# Patient Record
Sex: MALE | Race: WHITE | NOT HISPANIC OR LATINO | Employment: OTHER | URBAN - METROPOLITAN AREA
[De-identification: names, ages, dates, MRNs, and addresses within clinical notes are randomized per-mention and may not be internally consistent; named-entity substitution may affect disease eponyms.]

---

## 2018-09-26 ENCOUNTER — OFFICE VISIT (OUTPATIENT)
Dept: SLEEP MEDICINE | Facility: CLINIC | Age: 48
End: 2018-09-26
Payer: COMMERCIAL

## 2018-09-26 VITALS
DIASTOLIC BLOOD PRESSURE: 90 MMHG | TEMPERATURE: 98 F | WEIGHT: 257 LBS | SYSTOLIC BLOOD PRESSURE: 136 MMHG | RESPIRATION RATE: 16 BRPM | HEIGHT: 71 IN | BODY MASS INDEX: 35.98 KG/M2 | OXYGEN SATURATION: 96 % | HEART RATE: 81 BPM

## 2018-09-26 DIAGNOSIS — G47.33 OSA (OBSTRUCTIVE SLEEP APNEA): Primary | ICD-10-CM

## 2018-09-26 PROCEDURE — 99204 OFFICE O/P NEW MOD 45 MIN: CPT | Performed by: PHYSICIAN ASSISTANT

## 2018-09-26 NOTE — MR AVS SNAPSHOT
After Visit Summary   9/26/2018    Jamison Gerber    MRN: [id number]           Patient Information     Date Of Birth          1970        Visit Information        Provider Department      9/26/2018 11:00 AM Goltz, Bennett Ezra, PA-C [facility] [place]        Today's Diagnoses     SEEMA (obstructive sleep apnea)    -  1       Follow-ups after your visit        Your next 10 appointments already scheduled     Oct 10, 2018  1:00 PM CDT   HST  with BED 7 SH SLEEP   [facility] ([facility] [place])    [address]  [place] [address]   [phone]            Oct 11, 2018 11:30 AM CDT   HST Drop Off with BED 7 SH SLEEP   [facility] ([facility])    [address]  [facility] [address]   [phone]            Oct 17, 2018  4:30 PM CDT   Telephone Visit with Bennett Ezra Goltz, PA-C   [facility] [place] ([facility])    [address]  [facility] [address]   [phone]           Note: this is not an onsite visit; there is no need to come to the facility.              Future tests that were ordered for you today     Open Future Orders        Priority Expected Expires Ordered    HST-Home Sleep Apnea Test Routine  3/28/2019 9/26/2018            Who to contact     If you have questions or need follow up information about today's clinic visit or your schedule please contact [facility] directly at [phone].  Normal or non-critical lab and imaging results will be communicated to you by MyChart, letter or phone within 4 business days after the clinic has received the results. If you do not hear from us within 7 days, please contact the clinic through MyChart or phone. If you have a critical or abnormal lab result, we will notify you by phone as soon as possible.  Submit refill requests through Vodd[employer] or call your  "pharmacy and they will forward the refill request to us. Please allow 3 business days for your refill to be completed.          Additional Information About Your Visit        MyChart Information     [employer] lets you send messages to your doctor, view your test results, renew your prescriptions, schedule appointments and more. To sign up, go to [email or web address] . Click on \"Log in\" on the left side of the screen, which will take you to the Welcome page. Then click on \"Sign up Now\" on the right side of the page.     You will be asked to enter the access code listed below, as well as some personal information. Please follow the directions to create your username and password.     Your access code is: [email or web address]  Expires: 2018 12:25 PM     Your access code will  in 90 days. If you need help or a new code, please call your [place] clinic or [phone].        Care EveryWhere ID     This is your [facility] EveryWhere ID. This could be used by other organizations to access your [place] medical records  [id number]        Your Vitals Were     Pulse Temperature Respirations Height Pulse Oximetry BMI (Body Mass Index)    81 98  F (36.7  C) (Oral) 16 1.803 m (5' 11\") 96% 35.84 kg/m2       Blood Pressure from Last 3 Encounters:   18 136/90   12 126/83    Weight from Last 3 Encounters:   18 116.6 kg (257 lb)   12 105.7 kg (233 lb)              We Performed the Following     Comprehensive DME        Primary Care Provider Office Phone # Fax #    Rosangela Abdi [phone]       [address] [facility] [facility] [address]        Equal Access to Services     ESTUARDO BRADFORD : Meera Clarke, shelly aguilera, lalit segura, frank son. So [facility] [phone].    ATENCIÓN: Si habla español, tiene a burns disposición servicios gratuitos de asistencia lingüística. Ekaterina al [phone].    We comply with applicable federal " civil rights laws and [place] laws. We do not discriminate on the basis of race, color, national origin, age, disability, sex, sexual orientation, or gender identity.            Thank you!     Thank you for choosing [place] [facility]  for your care. Our goal is always to provide you with excellent care. Hearing back from our patients is one way we can continue to improve our services. Please take a few minutes to complete the written survey that you may receive in the mail after your visit with us. Thank you!             Your Updated Medication List - Protect others around you: Learn how to safely use, store and throw away your medicines at www.disposemymeds.org.      Notice  As of 9/26/2018 12:25 PM    You have not been prescribed any medications.

## 2018-10-10 ENCOUNTER — OFFICE VISIT (OUTPATIENT)
Dept: SLEEP MEDICINE | Facility: CLINIC | Age: 48
End: 2018-10-10
Payer: COMMERCIAL

## 2018-10-10 DIAGNOSIS — G47.33 OSA (OBSTRUCTIVE SLEEP APNEA): ICD-10-CM

## 2018-10-10 PROCEDURE — G0399 HOME SLEEP TEST/TYPE 3 PORTA: HCPCS | Performed by: INTERNAL MEDICINE

## 2018-10-10 NOTE — MR AVS SNAPSHOT
"              After Visit Summary   10/10/2018    Jamison Gerber    MRN: [id number]           Patient Information     Date Of Birth          1970        Visit Information        Provider Department      10/10/2018 1:00 PM BED 7 SH SLEEP [facility]        Today's Diagnoses     SEEMA (obstructive sleep apnea)           Follow-ups after your visit        Your next 10 appointments already scheduled     Oct 11, 2018 11:30 AM CDT   HST Drop Off with BED 7 SH SLEEP   [facility] ([facility])    [address]  [place] [id number] [phone]            Oct 17, 2018  4:30 PM CDT   Telephone Visit with Bennett Ezra Goltz, PA-C   [facility] ([facility])    [address]  [place] [address] [phone]           Note: this is not an onsite visit; there is no need to come to the facility.              Who to contact     If you have questions or need follow up information about today's clinic visit or your schedule please contact [facility] directly at [phone].  Normal or non-critical lab and imaging results will be communicated to you by MyChart, letter or phone within 4 business days after the clinic has received the results. If you do not hear from us within 7 days, please contact the clinic through [employer] or phone. If you have a critical or abnormal lab result, we will notify you by phone as soon as possible.  Submit refill requests through [employer] or call your pharmacy and they will forward the refill request to us. Please allow 3 business days for your refill to be completed.          Additional Information About Your Visit        MyChart Information     [employer] lets you send messages to your doctor, view your test results, renew your prescriptions, schedule appointments and more. To sign up, go to [email or web address] . Click on \"Log in\" on the left " "side of the screen, which will take you to the Welcome page. Then click on \"Sign up Now\" on the right side of the page.     You will be asked to enter the access code listed below, as well as some personal information. Please follow the directions to create your username and password.     Your access code is: [email or web address]  Expires: 2018 12:25 PM     Your access code will  in 90 days. If you need help or a new code, please call your [place] clinic or [phone].        Care EveryWhere ID     This is your Care EveryWhere ID. This could be used by other organizations to access your [place] medical records  [id number]         Blood Pressure from Last 3 Encounters:   18 136/90   12 126/83    Weight from Last 3 Encounters:   18 116.6 kg (257 lb)   12 105.7 kg (233 lb)              We Performed the Following     HST-Home Sleep Apnea Test        Primary Care Provider Office Phone # Fax #    Rosangela Abdi [phone]       [address] [facility] [address]        Equal Access to Services     ESTUARDO BRADFORD AH: Hadii ally littleo Sojacques, waaxda luqadaha, qaybta kaalmada adejenniferda, frank son. So [facility] [phone].    ATENCIÓN: Si habla español, tiene a burns disposición servicios gratuitos de asistencia lingüística. Ekaterina al [phone].    We comply with applicable federal civil rights laws and [place] laws. We do not discriminate on the basis of race, color, national origin, age, disability, sex, sexual orientation, or gender identity.            Thank you!     Thank you for choosing [place] [facility]  for your care. Our goal is always to provide you with excellent care. Hearing back from our patients is one way we can continue to improve our services. Please take a few minutes to complete the written survey that you may receive in the mail after your visit with us. Thank you!             Your Updated Medication " List - Protect others around you: Learn how to safely use, store and throw away your medicines at www.disposemymeds.org.      Notice  As of 10/10/2018  4:47 PM    You have not been prescribed any medications.

## 2018-10-11 ENCOUNTER — DOCUMENTATION ONLY (OUTPATIENT)
Dept: SLEEP MEDICINE | Facility: CLINIC | Age: 48
End: 2018-10-11
Payer: COMMERCIAL

## 2018-10-11 NOTE — PROGRESS NOTES
This HSAT was performed using a Noxturnal T3 device which recorded snore, sound, movement activity, body position, nasal pressure, oronasal thermal airflow, pulse, oximetry and both chest and abdominal respiratory effort. HSAT data was restricted to the time patient states they were in bed.     HSAT was scored using 1B 4% hypopnea rule.     HST AHI (Non-PAT): (P) 38.9  Snoring was reported as loud.  Time with SpO2 below 89% was 31.5 minutes.   Overall signal quality was good.     Pt will follow up with sleep provider to determine appropriate therapy.

## 2018-10-12 NOTE — PROCEDURES
"HOME SLEEP STUDY INTERPRETATION    Patient: Jamison Gerber  MRN: [id number]  YOB: 1970  Study Date: 10/10/2018  Referring Provider: Rosangela Abdi;   Ordering Provider: Bennett Goltz, PA     Indications for Home Study: Jamison Gerber is a 48 year old male with a history of previously diagnosed SEEMA, on CPAP who has a reevaluation with HST.    Estimated body mass index is 35.84 kg/(m^2) as calculated from the following:    Height as of 9/26/18: 1.803 m (5' 11\").    Weight as of 9/26/18: 116.6 kg (257 lb).  Total score - [place]: 5 (9/26/2018 10:57 AM)  STOP-BANG: -/8    Data: A full night home sleep study was performed recording the standard physiologic parameters including body position, movement, sound, nasal pressure, thermal oral airflow, chest and abdominal movements with respiratory inductance plethysmography, and oxygen saturation by pulse oximetry. Pulse rate was estimated by oximetry recording. This study was considered adequate based on > 4 hours of quality oximetry and respiratory recording. As specified by the AASM Manual for the Scoring of Sleep and Associated events, version 2.3, Rule VIII.D 1B, 4% oxygen desaturation scoring for hypopneas is used as a standard of care on all home sleep apnea testing.    Analysis Time:  240.7 minutes    Respiration:   Sleep Associated Hypoxemia: sustained hypoxemia was present. Baseline oxygen saturation was 91%.  Time with saturation less than or equal to 88% was 31.5 minutes. The lowest oxygen saturation was 75%.   Snoring: Snoring was present.  Respiratory events: The home study revealed a presence of 84 obstructive apneas and 1 mixed and 7 central apneas. There were 64 hypopneas resulting in a combined apnea/hypopnea index [AHI] of 38.9 events per hour.  AHI was 36.4 per hour supine, - per hour prone, 41.4 per hour on left side, and 39.6 per hour on right side.   Pattern: Excluding events noted above, respiratory rate and pattern was " Normal.    Position: Percent of time spent: supine - 15%, prone - 0%, on left - 39.7%, on right - 35.9%.    Heart Rate: By pulse oximetry normal rate was noted.     Assessment:   Severe obstructive sleep apnea.  Sleep associated hypoxemia was present.    Recommendations:   Continue CPAP therapy. .  Weight management.    Diagnosis Code(s): Obstructive Sleep Apnea G47.33, Hypoxemia G47.36    Pieter Espino MD, MD, October 12, 2018   Diplomate, American Board of Psychiatry and Neurology, Sleep Medicine

## 2018-10-17 ENCOUNTER — VIRTUAL VISIT (OUTPATIENT)
Dept: SLEEP MEDICINE | Facility: CLINIC | Age: 48
End: 2018-10-17
Payer: COMMERCIAL

## 2018-10-17 DIAGNOSIS — G47.33 OSA (OBSTRUCTIVE SLEEP APNEA): Primary | ICD-10-CM

## 2018-10-17 PROCEDURE — 98967 PH1 ASSMT&MGMT NQHP 11-20: CPT | Performed by: PHYSICIAN ASSISTANT

## 2018-10-17 NOTE — PROGRESS NOTES
Sleep Study Follow-Up Visit:    Date on this visit: 10/17/2018    Jamison Gerber had a virtual visit today for follow-up of his home sleep study done on 10/10/2018 at the [facility] for management of previously diagnosed SEEMA. His medical history is otherwise noncontributory.       He had a sleeps study at [facility] in 7/2004. His AHI was 34/hr, RDI 47/hr with an O2 [place] of 86%. CPAP 10 cm was effective.     Analysis Time:  240.7 minutes     Respiration:   Sleep Associated Hypoxemia: sustained hypoxemia was present. Baseline oxygen saturation was 91%.  Time with saturation less than or equal to 88% was 31.5 minutes. The lowest oxygen saturation was 75%.   Snoring: Snoring was present.  Respiratory events: The home study revealed a presence of 84 obstructive apneas and 1 mixed and 7 central apneas. There were 64 hypopneas resulting in a combined apnea/hypopnea index [AHI] of 38.9 events per hour.  AHI was 36.4 per hour supine, - per hour prone, 41.4 per hour on left side, and 39.6 per hour on right side.   Pattern: Excluding events noted above, respiratory rate and pattern was Normal.     Position: Percent of time spent: supine - 15%, prone - 0%, on left - 39.7%, on right - 35.9%.     Heart Rate: By pulse oximetry normal rate was noted.     His pressures are 10-14 cm (changed from 10-12 cm at the last visit). He feels better with the increased pressures. He is not even waking to urinate now.     Past medical/surgical history, family history, social history, medications and allergies were reviewed.      Problem List:  Patient Active Problem List    Diagnosis Date Noted     SEEMA (obstructive sleep apnea) 09/26/2018     Priority: Medium        Impression/Plan:    (G47.33) SEEMA (obstructive sleep apnea)  (primary encounter diagnosis)  Comment: This study confirms severe SEEMA, independent of position. He slept terribly without CPAP and is sleeping great now. He would like to allow [place] to have access to his  machine remotely. He was using [facility] in the past.   Plan: Comprehensive DME        Continue auto CPAP 10-14 cm. We talked about travel machines. I spoke with Raymond from [facility] to look into getting remote access to his machine.      He will follow up with me in about 1-2 year(s).     11 minutes spent with patient, all of which were spent counseling, consulting, coordinating plan of care.      Bennett Goltz, PA-C    CC: No ref. provider found

## 2018-10-17 NOTE — MR AVS SNAPSHOT
"              After Visit Summary   10/17/2018    Jamison Gerber    MRN: [id number]           Patient Information     Date Of Birth          1970        Visit Information        Provider Department      10/17/2018 4:30 PM Goltz, Bennett Ezra, PA-C [facility] [place]        Today's Diagnoses     SEEMA (obstructive sleep apnea)    -  1       Follow-ups after your visit        Follow-up notes from your care team     Return in about 1 year (around 10/17/2019) for CPAP compliance recheck.      Your next 10 appointments already scheduled     Oct 17, 2018  4:30 PM CDT   Telephone Visit with Bennett Ezra Goltz, PA-C   [facility] [place] ([facility])    [address] [address] [phone]           Note: this is not an onsite visit; there is no need to come to the facility.              Who to contact     If you have questions or need follow up information about today's clinic visit or your schedule please contact [facility] directly at [phone].  Normal or non-critical lab and imaging results will be communicated to you by [employer], letter or phone within 4 business days after the clinic has received the results. If you do not hear from us within 7 days, please contact the clinic through [employer] or phone. If you have a critical or abnormal lab result, we will notify you by phone as soon as possible.  Submit refill requests through [employer] or call your pharmacy and they will forward the refill request to us. Please allow 3 business days for your refill to be completed.          Additional Information About Your Visit        MyChart Information     [employer] lets you send messages to your doctor, view your test results, renew your prescriptions, schedule appointments and more. To sign up, go to [email or web address] . Click on \"Log in\" on the left side of the screen, which will take you to the Welcome page. Then click on \"Sign " "up Now\" on the right side of the page.     You will be asked to enter the access code listed below, as well as some personal information. Please follow the directions to create your username and password.     Your access code is: [email or web address]  Expires: 2018 12:25 PM     Your access code will  in 90 days. If you need help or a new code, please call your [place] clinic or [phone].        Care EveryWhere ID     This is your Care EveryWhere ID. This could be used by other organizations to access your [place] medical records  [id number]         Blood Pressure from Last 3 Encounters:   18 136/90   12 126/83    Weight from Last 3 Encounters:   18 116.6 kg (257 lb)   12 105.7 kg (233 lb)              We Performed the Following     Comprehensive DME        Primary Care Provider Office Phone # Fax #    Rosangela Abdi [phone]       [address] [address]        Equal Access to Services     [facility]: Hadii aad ku hadasho Soomaali, waaxda luqadaha, qaybta kaalmada adeernie, frank steele . So [facility] [phone].    ATENCIÓN: Si habla español, tiene a burns disposición servicios gratuitos de asistencia lingüística. Ekaterina al [phone].    We comply with applicable federal civil rights laws and [place] laws. We do not discriminate on the basis of race, color, national origin, age, disability, sex, sexual orientation, or gender identity.            Thank you!     Thank you for choosing [place] [facility]  for your care. Our goal is always to provide you with excellent care. Hearing back from our patients is one way we can continue to improve our services. Please take a few minutes to complete the written survey that you may receive in the mail after your visit with us. Thank you!             Your Updated Medication List - Protect others around you: Learn how to safely use, store and throw away your " medicines at www.disposemymeds.org.      Notice  As of 10/17/2018  2:03 PM    You have not been prescribed any medications.

## 2018-10-19 ENCOUNTER — TELEPHONE (OUTPATIENT)
Dept: SLEEP MEDICINE | Facility: CLINIC | Age: 48
End: 2018-10-19

## 2018-10-19 NOTE — TELEPHONE ENCOUNTER
LVM FOR PT INFORMING HIM THE TRANSFER IS COMPLETE AND HE CAN ORDER CPAP SUPPLIES FROM [facility]. LEFT THE MAIN NUMBER FOR CALL BACK OPTION.   ALSO INFORMED HIM HE SLEEP PROVIDER AT [place] WILL BE ABLE TO SEE HIS DOWNLOAD.      ALSO -   CALLED [facility] AND ASKED THAT THEY DELETE THEIR ACCT IN [place]. THIS WAY [place] CAN SEE PT'S DOWNLOAD. [facility] SAID THEY DELETED THEIR ACCT.    CREATED AN ACCT IN [place] FOR PT. IT APPEARS TO HAVE WORKED. WILL NEED TO CHECK ONCE MACHINE CONNECTS AGAIN. Last comment:

## 2018-12-06 ENCOUNTER — OFFICE VISIT (OUTPATIENT)
Dept: FAMILY MEDICINE | Facility: CLINIC | Age: 48
End: 2018-12-06
Payer: COMMERCIAL

## 2018-12-06 VITALS
BODY MASS INDEX: 35 KG/M2 | OXYGEN SATURATION: 98 % | WEIGHT: 250 LBS | HEART RATE: 66 BPM | SYSTOLIC BLOOD PRESSURE: 118 MMHG | TEMPERATURE: 97.7 F | DIASTOLIC BLOOD PRESSURE: 86 MMHG | HEIGHT: 71 IN

## 2018-12-06 DIAGNOSIS — Z13.220 LIPID SCREENING: ICD-10-CM

## 2018-12-06 DIAGNOSIS — L91.8 SKIN TAG: ICD-10-CM

## 2018-12-06 DIAGNOSIS — Z12.5 SCREENING FOR PROSTATE CANCER: ICD-10-CM

## 2018-12-06 DIAGNOSIS — E78.5 HYPERLIPIDEMIA LDL GOAL <100: ICD-10-CM

## 2018-12-06 DIAGNOSIS — Z12.11 SPECIAL SCREENING FOR MALIGNANT NEOPLASMS, COLON: ICD-10-CM

## 2018-12-06 DIAGNOSIS — Z82.49 FAMILY HISTORY OF ISCHEMIC HEART DISEASE: ICD-10-CM

## 2018-12-06 DIAGNOSIS — Z13.1 SCREENING FOR DIABETES MELLITUS: ICD-10-CM

## 2018-12-06 DIAGNOSIS — Z00.00 ROUTINE HISTORY AND PHYSICAL EXAMINATION OF ADULT: Primary | ICD-10-CM

## 2018-12-06 LAB
ANION GAP SERPL CALCULATED.3IONS-SCNC: 8 MMOL/L (ref 3–14)
BASOPHILS # BLD AUTO: 0 10E9/L (ref 0–0.2)
BASOPHILS NFR BLD AUTO: 0.3 %
BUN SERPL-MCNC: 18 MG/DL (ref 7–30)
CALCIUM SERPL-MCNC: 9.6 MG/DL (ref 8.5–10.1)
CHLORIDE SERPL-SCNC: 104 MMOL/L (ref 94–109)
CHOLEST SERPL-MCNC: 205 MG/DL
CO2 SERPL-SCNC: 25 MMOL/L (ref 20–32)
CREAT SERPL-MCNC: 0.88 MG/DL (ref 0.66–1.25)
DIFFERENTIAL METHOD BLD: NORMAL
EOSINOPHIL # BLD AUTO: 0.2 10E9/L (ref 0–0.7)
EOSINOPHIL NFR BLD AUTO: 2.9 %
ERYTHROCYTE [DISTWIDTH] IN BLOOD BY AUTOMATED COUNT: 13.8 % (ref 10–15)
GFR SERPL CREATININE-BSD FRML MDRD: >90 ML/MIN/1.7M2
GLUCOSE SERPL-MCNC: 100 MG/DL (ref 70–99)
HBA1C MFR BLD: 5.6 % (ref 0–5.6)
HCT VFR BLD AUTO: 46.2 % (ref 40–53)
HDLC SERPL-MCNC: 44 MG/DL
HGB BLD-MCNC: 15.3 G/DL (ref 13.3–17.7)
LDLC SERPL CALC-MCNC: 139 MG/DL
LYMPHOCYTES # BLD AUTO: 2.2 10E9/L (ref 0.8–5.3)
LYMPHOCYTES NFR BLD AUTO: 31.3 %
MCH RBC QN AUTO: 30 PG (ref 26.5–33)
MCHC RBC AUTO-ENTMCNC: 33.1 G/DL (ref 31.5–36.5)
MCV RBC AUTO: 91 FL (ref 78–100)
MONOCYTES # BLD AUTO: 0.8 10E9/L (ref 0–1.3)
MONOCYTES NFR BLD AUTO: 11.6 %
NEUTROPHILS # BLD AUTO: 3.7 10E9/L (ref 1.6–8.3)
NEUTROPHILS NFR BLD AUTO: 53.9 %
NONHDLC SERPL-MCNC: 161 MG/DL
PLATELET # BLD AUTO: 204 10E9/L (ref 150–450)
POTASSIUM SERPL-SCNC: 4.3 MMOL/L (ref 3.4–5.3)
RBC # BLD AUTO: 5.1 10E12/L (ref 4.4–5.9)
SODIUM SERPL-SCNC: 137 MMOL/L (ref 133–144)
TRIGL SERPL-MCNC: 109 MG/DL
WBC # BLD AUTO: 6.9 10E9/L (ref 4–11)

## 2018-12-06 PROCEDURE — 36415 COLL VENOUS BLD VENIPUNCTURE: CPT | Performed by: INTERNAL MEDICINE

## 2018-12-06 PROCEDURE — 80048 BASIC METABOLIC PNL TOTAL CA: CPT | Performed by: INTERNAL MEDICINE

## 2018-12-06 PROCEDURE — 99386 PREV VISIT NEW AGE 40-64: CPT | Performed by: INTERNAL MEDICINE

## 2018-12-06 PROCEDURE — 80061 LIPID PANEL: CPT | Performed by: INTERNAL MEDICINE

## 2018-12-06 PROCEDURE — G0103 PSA SCREENING: HCPCS | Performed by: INTERNAL MEDICINE

## 2018-12-06 PROCEDURE — 85025 COMPLETE CBC W/AUTO DIFF WBC: CPT | Performed by: INTERNAL MEDICINE

## 2018-12-06 PROCEDURE — 83036 HEMOGLOBIN GLYCOSYLATED A1C: CPT | Performed by: INTERNAL MEDICINE

## 2018-12-06 RX ORDER — CHOLECALCIFEROL (VITAMIN D3) 25 MCG
5000 CAPSULE ORAL DAILY
COMMUNITY

## 2018-12-06 RX ORDER — OMEGA-3 FATTY ACIDS/FISH OIL 300-1000MG
CAPSULE ORAL DAILY
COMMUNITY

## 2018-12-06 NOTE — MR AVS SNAPSHOT
After Visit Summary   12/6/2018    Jamison Gerber    MRN: [id number]           Patient Information     Date Of Birth          1970        Visit Information        Provider Department      12/6/2018 10:20 AM Danyelle Larkin MD [facility]        Today's Diagnoses     Routine history and physical examination of adult    -  1    Special screening for malignant neoplasms, colon        Lipid screening        Screening for diabetes mellitus        Screening for prostate cancer        Family history of ischemic heart disease        Hyperlipidemia LDL goal <100        Skin tag          Care Instructions      Preventive Health Recommendations  Male Ages 50 - 64    Yearly exam:             See your health care provider every year in order to  o   Review health changes.   o   Discuss preventive care.    o   Review your medicines if your doctor has prescribed any.     Have a cholesterol test every 5 years, or more frequently if you are at risk for high cholesterol/heart disease.     Have a diabetes test (fasting glucose) every three years. If you are at risk for diabetes, you should have this test more often.     Have a colonoscopy at age 50, or have a yearly FIT test (stool test). These exams will check for colon cancer.      Talk with your health care provider about whether or not a prostate cancer screening test (PSA) is right for you.    You should be tested each year for STDs (sexually transmitted diseases), if you re at risk.     Shots: Get a flu shot each year. Get a tetanus shot every 10 years.     Nutrition:    Eat at least 5 servings of fruits and vegetables daily.     Eat whole-grain bread, whole-wheat pasta and brown rice instead of white grains and rice.     Get adequate Calcium and Vitamin D.     Lifestyle    Exercise for at least 150 minutes a week (30 minutes a day, 5 days a week). This will help you control your weight and prevent disease.     Limit alcohol to one drink per day.      No smoking.     Wear sunscreen to prevent skin cancer.     See your dentist every six months for an exam and cleaning.     See your eye doctor every 1 to 2 years.            Follow-ups after your visit        Additional Services     CARDIO [facility] ADULT REFERRAL       [facility] is referring you to Cardiology Services.       The [facility] Representative will assist you in the coordination of your Cardiology care as prescribed by your physician.    The [facility] Representative will call you within 24 hours to help schedule your appointment, or you may contact the [facility] Representative at: [phone].         Type of Referral: New Cardiology Referral            Timeframe requested: within 4 weeks       Coverage of these services is subject to the terms and limitations of your health insurance plan.  Please call member services at your health plan with any benefit or coverage questions.      If X-rays, CT or MRI's have been performed, please contact the facility where they were done to arrange for , prior to your scheduled appointment.  Please bring this referral request to your appointment and present it to your specialist.            GASTROENTEROLOGY ADULT REF PROCEDURE ONLY [facility] [phone]; No Provider Preference       Last Lab Result: Creatinine (mg/dL)       Date                     Value                 08/26/2006               1.05             ----------  Body mass index is 34.87 kg/(m^2).     Needed:  No  Language:  English    Patient will be contacted to schedule procedure.     Please be aware that coverage of these services is subject to the terms and limitations of your health insurance plan.  Call member services at your health plan with any benefit or coverage questions.  Any procedures must be performed at a [facility] facility OR coordinated by your clinic's referral office.    Please bring the following with you to your appointment:    (1)  Any X-Rays, CTs or MRIs which have been performed.  Contact the facility where they were done to arrange for  prior to your scheduled appointment.    (2) List of current medications   (3) This referral request   (4) Any documents/labs given to you for this referral            SKIN CARE REFERRAL       Your provider has referred you to: [facility]: [facility] [phone] (Dr. Garth Lomax Jr.)   [email or web address]  [facility]: [facility] [place] [phone]  [email or web address]     Please be aware that coverage of these services is subject to the terms and limitations of your health insurance plan.  Please check with your insurance prior to the appointment to ensure appropriate coverage for any services considered cosmetic in nature or not medically necessary.    Please bring the following with you to your appointment:    (1) Any X-Rays, CTs or MRIs which have been performed.  Contact the facility where they were done to arrange for  prior to your scheduled appointment.  Any new CT, MRI or other procedures ordered by your specialist must be performed at a [facility] facility or coordinated by your clinic's referral office.  (2) List of current medications  (3) This referral request   (4) Any documents/labs given to you for this referral                  Follow-up notes from your care team     Return in about 1 year (around 12/6/2019).      Who to contact     If you have questions or need follow up information about today's clinic visit or your schedule please contact [facility] directly at [phone].  Normal or non-critical lab and imaging results will be communicated to you by MyChart, letter or phone within 4 business days after the clinic has received the results. If you do not hear from us within 7 days, please contact the clinic through MyChart or phone. If you have a critical  "or abnormal lab result, we will notify you by phone as soon as possible.  Submit refill requests through [employer] or call your pharmacy and they will forward the refill request to us. Please allow 3 business days for your refill to be completed.          Additional Information About Your Visit        [employer] Information     [employer] lets you send messages to your doctor, view your test results, renew your prescriptions, schedule appointments and more. To sign up, go to [email or web address] . Click on \"Log in\" on the left side of the screen, which will take you to the Welcome page. Then click on \"Sign up Now\" on the right side of the page.     You will be asked to enter the access code listed below, as well as some personal information. Please follow the directions to create your username and password.     Your access code is: [email or web address]  Expires: 2018 11:25 AM     Your access code will  in 90 days. If you need help or a new code, please call your [facility] clinic or [phone].        Care EveryWhere ID     This is your Care EveryWhere ID. This could be used by other organizations to access your [facility] medical records  [id number]        Your Vitals Were     Pulse Temperature Height Pulse Oximetry BMI (Body Mass Index)       66 97.7  F (36.5  C) (Tympanic) 5' 11\" (1.803 m) 98% 34.87 kg/m2        Blood Pressure from Last 3 Encounters:   18 118/86   18 136/90   12 126/83    Weight from Last 3 Encounters:   18 250 lb (113.4 kg)   18 257 lb (116.6 kg)   12 233 lb (105.7 kg)              We Performed the Following     Basic metabolic panel  (Ca, Cl, CO2, Creat, Gluc, K, Na, BUN)     CARDIO  ADULT REFERRAL     CBC with platelets and differential     GASTROENTEROLOGY ADULT REF PROCEDURE ONLY [facility] [phone]; No Provider Preference     Hemoglobin A1c     Lipid panel reflex to direct LDL Fasting     PSA, screen     SKIN CARE REFERRAL        " Primary Care Provider Office Phone # Fax #    Rosangela Abdi [phone]       [address]  [facility] [address]        Equal Access to Services     ESTUARDO BRADFORD : Hadii aad ku hadchecoo Sokayaali, waaxda luqadaha, qaybta kaalmada adejenniferda, frank raogrzegorz kumartisha eliasnikkie son. So [facility] [phone].    ATENCIÓN: Si habla español, tiene a burns disposición servicios gratuitos de asistencia lingüística. Llame al [phone].    We comply with applicable federal civil rights laws and [place] laws. We do not discriminate on the basis of race, color, national origin, age, disability, sex, sexual orientation, or gender identity.            Thank you!     Thank you for choosing [facility]  for your care. Our goal is always to provide you with excellent care. Hearing back from our patients is one way we can continue to improve our services. Please take a few minutes to complete the written survey that you may receive in the mail after your visit with us. Thank you!             Your Updated Medication List - Protect others around you: Learn how to safely use, store and throw away your medicines at www.disposemymeds.org.          This list is accurate as of 12/6/18 10:53 AM.  Always use your most recent med list.                   Brand Name Dispense Instructions for use Diagnosis    MULTIVITAMIN ADULT PO           omega 3 1000 MG Caps           PROBIOTIC DAILY PO           PSYLLIUM HUSK PO           Vitamin D-3 1000 units Caps

## 2018-12-06 NOTE — PROGRESS NOTES
SUBJECTIVE:   CC: Jamison Gerber is an 48 year old male who presents for preventive health visit.     Healthy Habits:    Do you get at least three servings of calcium containing foods daily (dairy, green leafy vegetables, etc.)? yes    Amount of exercise or daily activities, outside of work: 7 day(s) per week    Problems taking medications regularly not applicable    Medication side effects: No    Have you had an eye exam in the past two years? yes    Do you see a dentist twice per year? yes    Do you have sleep apnea, excessive snoring or daytime drowsiness? yes - APNEA, uses a CPAP      Today's PHQ-2 Score:   PHQ-2 ( 1999 Pfizer) 12/6/2018   Q1: Little interest or pleasure in doing things 0   Q2: Feeling down, depressed or hopeless 0   PHQ-2 Score 0       Abuse: Current or Past(Physical, Sexual or Emotional)- No  Do you feel safe in your environment? Yes    Social History   Substance Use Topics     Smoking status: Former Smoker     Quit date: 1/24/2012     Smokeless tobacco: Never Used     Alcohol use Yes      Comment: 3-4 days per week, 2-3 drinks     If you drink alcohol do you typically have >3 drinks per day or >7 drinks per week? Yes - AUDIT SCORE:     AUDIT - Alcohol Use Disorders Identification Test - Reproduced from the World Health Organization Audit 2001 (Second Edition) 12/6/2018   1.  How often do you have a drink containing alcohol? 2 to 3 times a week   2.  How many drinks containing alcohol do you have on a typical day when you are drinking? 3 or 4   3.  How often do you have five or more drinks on one occasion? Weekly   4.  How often during the last year have you found that you were not able to stop drinking once you had started? Never   5.  How often during the last year have you failed to do what was normally expected of you because of drinking? Never   6.  How often during the last year have you needed a first drink in the morning to get yourself going after a heavy drinking session? Never  "  7.  How often during the last year have you had a feeling of guilt or remorse after drinking? Never   8.  How often during the last year have you been unable to remember what happened the night before because of your drinking? Less than monthly   9.  Have you or someone else been injured because of your drinking? No   10. Has a relative, friend, doctor or other health care worker been concerned about your drinking or suggested you cut down? No   TOTAL SCORE 8                         Last PSA: No results found for: PSA    Reviewed orders with patient. Reviewed health maintenance and updated orders accordingly - Yes  Labs reviewed in EPIC    Reviewed and updated as needed this visit by clinical staff         Reviewed and updated as needed this visit by Provider        Past Medical History:   Diagnosis Date     Other abnormal glucose      Sleep apnea     uses CPap        ROS:  CONSTITUTIONAL: NEGATIVE for fever, chills, change in weight  INTEGUMENTARY/SKIN: POSITIVE for skin tags  EYES: NEGATIVE for vision changes or irritation  ENT: NEGATIVE for ear, mouth and throat problems  RESP: NEGATIVE for significant cough or SOB  CV: NEGATIVE for chest pain, palpitations or peripheral edema  GI: NEGATIVE for nausea, abdominal pain, heartburn, or change in bowel habits   male: negative for dysuria, hematuria, decreased urinary stream, erectile dysfunction, urethral discharge  MUSCULOSKELETAL: NEGATIVE for significant arthralgias or myalgia  NEURO: NEGATIVE for weakness, dizziness or paresthesias  PSYCHIATRIC: NEGATIVE for changes in mood or affect    OBJECTIVE:   /86 (BP Location: Left arm, Cuff Size: Adult Large)  Pulse 66  Temp 97.7  F (36.5  C) (Tympanic)  Ht 5' 11\" (1.803 m)  Wt 250 lb (113.4 kg)  SpO2 98%  BMI 34.87 kg/m2  EXAM:  GENERAL: alert and no distress  EYES: Eyes grossly normal to inspection, PERRL and conjunctivae and sclerae normal  HENT: ear canals and TM's normal, nose and mouth without ulcers " "or lesions  NECK: no adenopathy, no asymmetry, masses, or scars and thyroid normal to palpation  RESP: lungs clear to auscultation - no rales, rhonchi or wheezes  CV: regular rate and rhythm, normal S1 S2, no S3 or S4, no murmur, click or rub, no peripheral edema and peripheral pulses strong  ABDOMEN: soft, nontender, no hepatosplenomegaly, no masses and bowel sounds normal  MS: no gross musculoskeletal defects noted, no edema  SKIN: multiple skin tags noted on neck  NEURO: Normal strength and tone, mentation intact and speech normal  PSYCH: mentation appears normal, affect normal/bright    Diagnostic Test Results:  Results for orders placed or performed in visit on 10/10/18   HST-Home Sleep Apnea Test    Narrative    Pieter Espino MD     10/12/2018 10:40 AM  HOME SLEEP STUDY INTERPRETATION    Patient: Jamison Gerber  MRN: [id number]  YOB: 1970  Study Date: 10/10/2018  Referring Provider: Rosangela Abdi;   Ordering Provider: Bennett Goltz, PA     Indications for Home Study: Jamison Gerber is a 48 year old male   with a history of previously diagnosed SEEMA, on CPAP who has a   reevaluation with HST.    Estimated body mass index is 35.84 kg/(m^2) as calculated from   the following:    Height as of 9/26/18: 1.803 m (5' 11\").    Weight as of 9/26/18: 116.6 kg (257 lb).  Total score - Greer: 5 (9/26/2018 10:57 AM)  STOP-BANG: -/8    Data: A full night home sleep study was performed recording the   standard physiologic parameters including body position,   movement, sound, nasal pressure, thermal oral airflow, chest and   abdominal movements with respiratory inductance plethysmography,   and oxygen saturation by pulse oximetry. Pulse rate was estimated   by oximetry recording. This study was considered adequate based   on > 4 hours of quality oximetry and respiratory recording. As   specified by the AASM Manual for the Scoring of Sleep and   Associated events, version 2.3, Rule VIII.D 1B, 4% oxygen "   desaturation scoring for hypopneas is used as a standard of care   on all home sleep apnea testing.    Analysis Time:  240.7 minutes    Respiration:   Sleep Associated Hypoxemia: sustained hypoxemia was present.   Baseline oxygen saturation was 91%.  Time with saturation less   than or equal to 88% was 31.5 minutes. The lowest oxygen   saturation was 75%.   Snoring: Snoring was present.  Respiratory events: The home study revealed a presence of 84   obstructive apneas and 1 mixed and 7 central apneas. There were   64 hypopneas resulting in a combined apnea/hypopnea index [AHI]   of 38.9 events per hour.  AHI was 36.4 per hour supine, - per   hour prone, 41.4 per hour on left side, and 39.6 per hour on   right side.   Pattern: Excluding events noted above, respiratory rate and   pattern was Normal.    Position: Percent of time spent: supine - 15%, prone - 0%, on   left - 39.7%, on right - 35.9%.    Heart Rate: By pulse oximetry normal rate was noted.     Assessment:   Severe obstructive sleep apnea.  Sleep associated hypoxemia was present.    Recommendations:   Continue CPAP therapy. .  Weight management.    Diagnosis Code(s): Obstructive Sleep Apnea G47.33, Hypoxemia   G47.36    Pieter Espino MD, MD, October 12, 2018   Diplomate, American Board of Psychiatry and Neurology, Sleep   Medicine       ASSESSMENT/PLAN:   (Z00.00) Routine history and physical examination of adult  (primary encounter diagnosis)  Comment: yearly physical exam today  Plan: CBC with platelets and differential, Basic         metabolic panel  (Ca, Cl, CO2, Creat, Gluc, K,         Na, BUN)      (Z12.11) Special screening for malignant neoplasms, colon  Comment: patient is due for colon cancer screening  Plan: GASTROENTEROLOGY ADULT REF PROCEDURE ONLY         Pablo [facility]acy [phone]; No Provider        Preference      (Z13.220) Lipid screening  Comment: patient is due for lipid screening  Plan: Lipid panel reflex to direct LDL  "Fasting      (Z13.1) Screening for diabetes mellitus  Comment: patient is due for Hgb A1c lab  Plan: Hemoglobin A1c      (Z12.5) Screening for prostate cancer  Comment: patient is due for PSA lab  Plan: PSA, screen      (E78.5) Hyperlipidemia LDL goal <100  (Z82.49) Family history of ischemic heart disease  Comment: patient has a positive family history of CAD, his father  of CAD at age 54.  Plan: CARDIO  ADULT REFERRAL      (L91.8) Skin tag  Comment: chronic skin tags  Plan: SKIN CARE REFERRAL          COUNSELING:  Reviewed preventive health counseling, as reflected in patient instructions  Special attention given to:        Regular exercise       Healthy diet/nutrition    BP Readings from Last 1 Encounters:   18 136/90     Estimated body mass index is 35.84 kg/(m^2) as calculated from the following:    Height as of 18: 5' 11\" (1.803 m).    Weight as of 18: 257 lb (116.6 kg).        Weight management plan: Discussed healthy diet and exercise guidelines     reports that he quit smoking about 6 years ago. He has never used smokeless tobacco.      Counseling Resources:  ATP IV Guidelines  Pooled Cohorts Equation Calculator  FRAX Risk Assessment  ICSI Preventive Guidelines  Dietary Guidelines for Americans,   USDA's MyPlate  ASA Prophylaxis  Lung CA Screening    Danyelle Larkin MD  [facility]  "

## 2018-12-06 NOTE — LETTER
[place] [address] [facility]  [address]  [place]  [address]  Tel: [phone]    December 28, 2018    Jamisno Gerber  [address]  [facility] [address]        Dear Mr. Gerber,    The results of your recent labs Ok except for mild hyperlipidemia, advise diet and exercise. No change in meds.      If you have any further questions or problems, please contact our office.      Sincerely,    Ned Larkin MD/[facility]          Enclosure: Lab Results  Results for orders placed or performed in visit on 12/06/18   CBC with platelets and differential   Result Value Ref Range    WBC 6.9 4.0 - 11.0 10e9/L    RBC Count 5.10 4.4 - 5.9 10e12/L    Hemoglobin 15.3 13.3 - 17.7 g/dL    Hematocrit 46.2 40.0 - 53.0 %    MCV 91 78 - 100 fl    MCH 30.0 26.5 - 33.0 pg    MCHC 33.1 31.5 - 36.5 g/dL    RDW 13.8 10.0 - 15.0 %    Platelet Count 204 150 - 450 10e9/L    % Neutrophils 53.9 %    % Lymphocytes 31.3 %    % Monocytes 11.6 %    % Eosinophils 2.9 %    % Basophils 0.3 %    Absolute Neutrophil 3.7 1.6 - 8.3 10e9/L    Absolute Lymphocytes 2.2 0.8 - 5.3 10e9/L    Absolute Monocytes 0.8 0.0 - 1.3 10e9/L    Absolute Eosinophils 0.2 0.0 - 0.7 10e9/L    Absolute Basophils 0.0 0.0 - 0.2 10e9/L    Diff Method Automated Method    Lipid panel reflex to direct LDL Fasting   Result Value Ref Range    Cholesterol 205 (H) <200 mg/dL    Triglycerides 109 <150 mg/dL    HDL Cholesterol 44 >39 mg/dL    LDL Cholesterol Calculated 139 (H) <100 mg/dL    Non HDL Cholesterol 161 (H) <130 mg/dL   PSA, screen   Result Value Ref Range    PSA 0.62 0 - 4 ug/L   Basic metabolic panel  (Ca, Cl, CO2, Creat, Gluc, K, Na, BUN)   Result Value Ref Range    Sodium 137 133 - 144 mmol/L    Potassium 4.3 3.4 - 5.3 mmol/L    Chloride 104 94 - 109 mmol/L    Carbon Dioxide 25 20 - 32 mmol/L    Anion Gap 8 3 - 14 mmol/L    Glucose 100 (H) 70 - 99 mg/dL    Urea Nitrogen 18 7 - 30 mg/dL    Creatinine 0.88 0.66 - 1.25 mg/dL    GFR Estimate >90 >60 mL/min/1.7m2    GFR  Estimate If Black >90 >60 mL/min/1.7m2    Calcium 9.6 8.5 - 10.1 mg/dL   Hemoglobin A1c   Result Value Ref Range    Hemoglobin A1C 5.6 0 - 5.6 %

## 2018-12-07 LAB — PSA SERPL-ACNC: 0.62 UG/L (ref 0–4)

## 2018-12-14 ENCOUNTER — OFFICE VISIT (OUTPATIENT)
Dept: CARDIOLOGY | Facility: CLINIC | Age: 48
End: 2018-12-14
Attending: INTERNAL MEDICINE
Payer: COMMERCIAL

## 2018-12-14 VITALS
SYSTOLIC BLOOD PRESSURE: 127 MMHG | WEIGHT: 253.9 LBS | DIASTOLIC BLOOD PRESSURE: 83 MMHG | HEIGHT: 71 IN | HEART RATE: 76 BPM | BODY MASS INDEX: 35.55 KG/M2

## 2018-12-14 DIAGNOSIS — R00.2 PALPITATIONS: ICD-10-CM

## 2018-12-14 DIAGNOSIS — Z82.49 FAMILY HISTORY OF ISCHEMIC HEART DISEASE: Primary | ICD-10-CM

## 2018-12-14 PROCEDURE — 99204 OFFICE O/P NEW MOD 45 MIN: CPT | Performed by: INTERNAL MEDICINE

## 2018-12-14 PROCEDURE — 93000 ELECTROCARDIOGRAM COMPLETE: CPT | Performed by: INTERNAL MEDICINE

## 2018-12-14 ASSESSMENT — MIFFLIN-ST. JEOR: SCORE: 2033.81

## 2018-12-14 NOTE — PROGRESS NOTES
Service Date: 2018      REASON FOR CONSULTATION:  Self-referral for family history of heart disease.        HISTORY OF PRESENT ILLNESS:  This is a pleasant 48-year-old male patient who denies any prior cardiovascular history.  He says he has a history of obstructive sleep apnea.  The patient says he just turned 48 and wanted to make sure that he is doing okay.  He says his father  suddenly at the age of 54.  He says his father was healthy and 1 day had a big MI and suddenly passed.        The patient denies taking any medications.  He used to be a prior smoker and quit 6 years ago.  Never smoked heavily.      In the last few months, the patient has been noticing some palpitations that he says happen most of the times when he comes out of a hot shower.  He is wearing a Fitbit and says occasionally his heart rate runs about 120.  Denies any chest pain, shortness of breath, syncope or presyncope.  Otherwise, he is functionally pretty active.  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      PHYSICAL EXAMINATION:   VITAL SIGNS:  Blood pressure today 127/83, pulse of 76, weight is 115 kg, BMI of 35.     GENERAL:  Alert, oriented x 3, pleasant male in no acute distress.   NECK:  Neck supple.  JVP is normal.     CARDIOVASCULAR:  Normal heart sounds without murmur, rubs or gallops.   EXTREMITIES:  Warm, no edema.     LUNGS:  Clear to auscultation bilaterally.   ABDOMEN:  Soft, nontender, nondistended.   NEUROLOGIC:  Nonfocal motor exam.   PSYCHIATRIC:  Appropriate affect.      DATA:  His last lipid panel revealed a total cholesterol of 205 and an HDL of 44, LDL of 139.  His sodium, potassium, creatinine, hemoglobin and platelets are normal.  His last A1c was normal at 5.6.  ECG today reveals normal sinus rhythm without any signs of ischemia or infarction.  He has not had an echocardiogram previously.      ASSESSMENT AND PLAN:   1.  Palpitations.   2.  Family history of coronary artery disease.        At this point, the patient is  complaining of nonspecific sounding palpitations that happen when he comes out of a hot shower.  Otherwise, denies syncope or presyncope.  We will start with an echocardiogram and Holter monitor for 48 hours.  My guess is that these are benign sinus tachycardia episodes.       Due to his strong premature family history of coronary artery disease and SCD, he is concerned about his own cardiovascular risk profile.  His LDL is 139 and he is overweight with a BMI of 36.  He does attribute to eating unhealthy food.  He denies any ischemic or anginal symptoms at this point.  Based on the atherosclerotic cardiovascular disease risk calculator, his 10-year anticipated risk of major adverse cardiovascular event is about 4%; however, this does not take into account his strong family history of premature coronary artery disease and his weight and other unaccounted risk factors unfortunately.     As such, we discussed that given his intermediate risk profile, a CT coronary calcium score may be reasonable to further risk stratify him.  He agrees to getting that done and understands that this would be an out-of-pocket pay for him.  If a CT coronary calcium score is positive, then he will likely qualify for statin therapy.        I will see him back in about a month with the results of the CT, echo and his Holter monitor.  I have advised him on a healthy lifestyle, weight loss and graded exercise and no smoking.        cc:      Danyelle Larkin MD    [facility]   [address]   [place] [address]         MARICEL HERNÁNDEZ MD             D: 2018   T: 2018   MT: RUDI      Name:     CYNTHIA GOODEN   MRN:      [id number]        Account:      [id number]   :      1970           Service Date: 2018      Document: [id number]

## 2018-12-14 NOTE — LETTER
2018    Danyelle Larkin MD  [address] [facility] [address]  [place] [address]      RE: Jamison Gerber       Dear Colleague,    I had the pleasure of seeing Jamison Gerber in the [facility].    Service Date: 2018      REASON FOR CONSULTATION:  Self-referral for family history of heart disease.        HISTORY OF PRESENT ILLNESS:  This is a pleasant 48-year-old male patient who denies any prior cardiovascular history.  He says he has a history of obstructive sleep apnea.  The patient says he just turned 48 and wanted to make sure that he is doing okay.  He says his father  suddenly at the age of 54.  He says his father was healthy and 1 day had a big MI and suddenly passed.        The patient denies taking any medications.  He used to be a prior smoker and quit 6 years ago.  Never smoked heavily.      In the last few months, the patient has been noticing some palpitations that he says happen most of the times when he comes out of a hot shower.  He is wearing a Fitbit and says occasionally his heart rate runs about 120.  Denies any chest pain, shortness of breath, syncope or presyncope.  Otherwise, he is functionally pretty active.  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      PHYSICAL EXAMINATION:   VITAL SIGNS:  Blood pressure today 127/83, pulse of 76, weight is 115 kg, BMI of 35.     GENERAL:  Alert, oriented x 3, pleasant male in no acute distress.   NECK:  Neck supple.  JVP is normal.     CARDIOVASCULAR:  Normal heart sounds without murmur, rubs or gallops.   EXTREMITIES:  Warm, no edema.     LUNGS:  Clear to auscultation bilaterally.   ABDOMEN:  Soft, nontender, nondistended.   NEUROLOGIC:  Nonfocal motor exam.   PSYCHIATRIC:  Appropriate affect.      DATA:  His last lipid panel revealed a total cholesterol of 205 and an HDL of 44, LDL of 139.  His sodium, potassium, creatinine, hemoglobin and platelets are normal.  His last A1c was normal at 5.6.  ECG today reveals normal sinus rhythm  without any signs of ischemia or infarction.  He has not had an echocardiogram previously.      ASSESSMENT AND PLAN:   1.  Palpitations.   2.  Family history of coronary artery disease.        At this point, the patient is complaining of nonspecific sounding palpitations that happen when he comes out of a hot shower.  Otherwise, denies syncope or presyncope.  We will start with an echocardiogram and Holter monitor for 48 hours.  My guess is that these are benign sinus tachycardia episodes.       Due to his strong premature family history of coronary artery disease and SCD, he is concerned about his own cardiovascular risk profile.  His LDL is 139 and he is overweight with a BMI of 36.  He does attribute to eating unhealthy food.  He denies any ischemic or anginal symptoms at this point.  Based on the atherosclerotic cardiovascular disease risk calculator, his 10-year anticipated risk of major adverse cardiovascular event is about 4%; however, this does not take into account his strong family history of premature coronary artery disease and his weight and other unaccounted risk factors unfortunately.     As such, we discussed that given his intermediate risk profile, a CT coronary calcium score may be reasonable to further risk stratify him.  He agrees to getting that done and understands that this would be an out-of-pocket pay for him.  If a CT coronary calcium score is positive, then he will likely qualify for statin therapy.        I will see him back in about a month with the results of the CT, echo and his Holter monitor.  I have advised him on a healthy lifestyle, weight loss and graded exercise and no smoking.        cc:      Danyelle Larkin MD    [facility]   [address]   [place] [address]         MARICEL HERNÁNDEZ MD             D: 2018   T: 2018   MT: RUDI      Name:     CYNTHIA GOODEN   MRN:      [id number]        Account:      [id number]   :      1970            Service Date: 12/14/2018      Document: [id number]           Outpatient Encounter Medications as of 12/14/2018   Medication Sig Dispense Refill     Cholecalciferol (VITAMIN D-3) 1000 units CAPS Take 5,000 Units by mouth daily        Multiple Vitamins-Minerals (MULTIVITAMIN ADULT PO)        omega 3 1000 MG CAPS Take by mouth daily        Probiotic Product (PROBIOTIC DAILY PO) Take by mouth daily        PSYLLIUM HUSK PO Take by mouth daily        No facility-administered encounter medications on file as of 12/14/2018.        Again, thank you for allowing me to participate in the care of your patient.      Sincerely,    Yumiko Santos MD     [facility]

## 2018-12-14 NOTE — PROGRESS NOTES
HPI and Plan:   See dictation [id number]    Orders Placed This Encounter   Procedures     CT Coronary Calcium Scan     Follow-Up with Cardiologist     EKG 12-lead complete w/read - Clinics (performed today)     Holter Monitor 48 hour Adult Pediatric     Echocardiogram Complete     No orders of the defined types were placed in this encounter.    There are no discontinued medications.      Encounter Diagnoses   Name Primary?     Family history of ischemic heart disease Yes     Palpitations        CURRENT MEDICATIONS:  Current Outpatient Medications   Medication Sig Dispense Refill     Cholecalciferol (VITAMIN D-3) 1000 units CAPS Take 5,000 Units by mouth daily        Multiple Vitamins-Minerals (MULTIVITAMIN ADULT PO)        omega 3 1000 MG CAPS Take by mouth daily        Probiotic Product (PROBIOTIC DAILY PO) Take by mouth daily        PSYLLIUM HUSK PO Take by mouth daily          ALLERGIES   No Known Allergies    PAST MEDICAL HISTORY:  Past Medical History:   Diagnosis Date     Family history of heart disease      Hyperlipidemia      Other abnormal glucose      Sleep apnea     uses CPap       PAST SURGICAL HISTORY:  Past Surgical History:   Procedure Laterality Date     EXCISE LESION EYELID  2012    Procedure:EXCISE LESION EYELID; LEFT UPPER LID LESION EXCISION WITH SILICONE INTUBATION; Surgeon:MANUEL JIMENEZ; Location:[facility]       FAMILY HISTORY:  Family History   Problem Relation Age of Onset     Cerebrovascular Disease Father          age 54     Myocardial Infarction Father          at 54       SOCIAL HISTORY:  Social History     Socioeconomic History     Marital status: Single     Spouse name: None     Number of children: None     Years of education: None     Highest education level: None   Social Needs     Financial resource strain: None     Food insecurity - worry: None     Food insecurity - inability: None     Transportation needs - medical: None     Transportation needs - non-medical: None  "  Occupational History     None   Tobacco Use     Smoking status: Former Smoker     Packs/day: 0.50     Years: 20.00     Pack years: 10.00     Types: Cigarettes     Last attempt to quit: 2012     Years since quittin.8     Smokeless tobacco: Current User     Types: Chew   Substance and Sexual Activity     Alcohol use: Yes     Comment: 3-4 days per week, 2-3 drinks     Drug use: Yes     Types: Marijuana     Comment: couple times per week     Sexual activity: Yes     Partners: Female   Other Topics Concern     Parent/sibling w/ CABG, MI or angioplasty before 65F 55M? Yes   Social History Narrative     None       Review of Systems:  Skin:  Negative     Eyes:  Positive for    ENT:  Negative    Respiratory:  Positive for sleep apnea;CPAP  Cardiovascular:  syncope or near-syncope;Negative;chest pain;edema;cyanosis;fatigue;exercise intolerance Positive for;palpitations;lightheadedness;dizziness  Gastroenterology: Negative    Genitourinary:  Positive for decreased urinary stream  Musculoskeletal:  Negative    Neurologic:  Negative    Psychiatric:  Negative    Heme/Lymph/Imm:  Negative    Endocrine:  Negative      Physical Exam:  Vitals: /83 (BP Location: Right arm, Cuff Size: Adult Large)   Pulse 76   Ht 1.803 m (5' 11\")   Wt 115.2 kg (253 lb 14.4 oz)   BMI 35.41 kg/m        Recent Lab Results:  LIPID RESULTS:  Lab Results   Component Value Date    CHOL 205 (H) 2018    HDL 44 2018     (H) 2018    TRIG 109 2018       LIVER ENZYME RESULTS:  No results found for: AST, ALT    CBC RESULTS:  Lab Results   Component Value Date    WBC 6.9 2018    RBC 5.10 2018    HGB 15.3 2018    HCT 46.2 2018    MCV 91 2018    MCH 30.0 2018    MCHC 33.1 2018    RDW 13.8 2018     2018       BMP RESULTS:  Lab Results   Component Value Date     2018    POTASSIUM 4.3 2018    CHLORIDE 104 2018    CO2 25 2018    " ANIONGAP 8 12/06/2018     (H) 12/06/2018    BUN 18 12/06/2018    CR 0.88 12/06/2018    GFRESTIMATED >90 12/06/2018    GFRESTBLACK >90 12/06/2018    BIANKA 9.6 12/06/2018        A1C RESULTS:  Lab Results   Component Value Date    A1C 5.6 12/06/2018       INR RESULTS:  No results found for: INR        CC  Danyelle Larkin MD  [address]  [place] [id number]

## 2018-12-28 ENCOUNTER — HOSPITAL ENCOUNTER (OUTPATIENT)
Dept: CARDIOLOGY | Facility: CLINIC | Age: 48
End: 2018-12-28
Attending: INTERNAL MEDICINE
Payer: COMMERCIAL

## 2018-12-28 ENCOUNTER — HOSPITAL ENCOUNTER (OUTPATIENT)
Dept: CARDIOLOGY | Facility: CLINIC | Age: 48
Discharge: HOME | End: 2018-12-28
Attending: INTERNAL MEDICINE | Admitting: INTERNAL MEDICINE
Payer: COMMERCIAL

## 2018-12-28 DIAGNOSIS — R00.2 PALPITATIONS: ICD-10-CM

## 2018-12-28 PROCEDURE — 93225 XTRNL ECG REC<48 HRS REC: CPT

## 2018-12-28 PROCEDURE — 25500064 ZZH RX 255 OP 636: Performed by: INTERNAL MEDICINE

## 2018-12-28 PROCEDURE — 93306 TTE W/DOPPLER COMPLETE: CPT

## 2018-12-28 PROCEDURE — 93306 TTE W/DOPPLER COMPLETE: CPT | Mod: 26 | Performed by: INTERNAL MEDICINE

## 2018-12-28 PROCEDURE — 93227 XTRNL ECG REC<48 HR R&I: CPT | Performed by: INTERNAL MEDICINE

## 2018-12-28 RX ADMIN — HUMAN ALBUMIN MICROSPHERES AND PERFLUTREN 5 ML: 10; .22 INJECTION, SOLUTION INTRAVENOUS at 08:43

## 2019-01-02 ENCOUNTER — CARE COORDINATION (OUTPATIENT)
Dept: CARDIOLOGY | Facility: CLINIC | Age: 49
End: 2019-01-02

## 2019-01-02 DIAGNOSIS — I48.91 ATRIAL FIBRILLATION (H): Primary | ICD-10-CM

## 2019-01-02 RX ORDER — METOPROLOL TARTRATE 25 MG/1
25 TABLET, FILM COATED ORAL 2 TIMES DAILY
Qty: 180 TABLET | Refills: 3 | Status: SHIPPED | OUTPATIENT
Start: 2019-01-02 | End: 2019-12-23

## 2019-01-02 NOTE — PROGRESS NOTES
Called Margarita Pickett @ [phone] - spoke with ARTURO Cantrell. Updated her that we are aware pt has Afib, per recent Holter Monitor, and that he did not need to start any anticoagulation but was started on Metoprolol tartrate 25 mg BID.

## 2019-01-02 NOTE — PROGRESS NOTES
Patient had Holter Monitor placed after new patient OV recently, for c/o occasional palpitations. Dr. Santos was reading holter results while rounding in hospital this week. Results are not yet scanned in Epic, but new Afib recorded on Holter. Clarified metoprolol order with Dr. Sanots, he'd like pt to start on metoprolol tartrate 25 mg BID.     Called pt to review findings on his Holter and recommendation to start metoprolol tartrate 25 mg BID. Pt was very worked up and had many questions about the medication and atrial fibrillation. Answered pt questions and reassured best I could. Pt at first unwilling to start any medication. He reports being scheduled for a colonoscopy in the coming days. Explained to pt importance of starting metoprolol for rate control. He denies hx of DM, CVA/TIA or HTN. By end of conversation pt willing to start metoprolol. Reassured pt, gave him nurse phone number to call PRN for questions or concerns and I told pt I would reach out to his GI doctor (Jeff Pickett) to confirm we are aware of afib in case he is in afib day of his colonoscopy. Rx sent.      Message   Received: Today   Message Contents   Yumiko Santos MD  [facility] Heart Core Nurse             I read his Holter monitor and he seems to have new onset AFib. My understanding is that he has no risk factors for CVA and his score is 0. Please confirm about HTN prior CVA/TIA and DM with the patient.     I would recommend Metoprolol 25 mg BID initiated please and have him seen by me in clinic soon after his CAC score is back. Thanks.     Yumiko Santos

## 2019-01-03 ENCOUNTER — HOSPITAL ENCOUNTER (OUTPATIENT)
Dept: CARDIOLOGY | Facility: CLINIC | Age: 49
Discharge: HOME | End: 2019-01-03
Attending: INTERNAL MEDICINE | Admitting: INTERNAL MEDICINE
Payer: COMMERCIAL

## 2019-01-03 DIAGNOSIS — Z82.49 FAMILY HISTORY OF ISCHEMIC HEART DISEASE: ICD-10-CM

## 2019-01-03 PROCEDURE — 75571 CT HRT W/O DYE W/CA TEST: CPT | Mod: GA

## 2019-01-03 PROCEDURE — 75571 CT HRT W/O DYE W/CA TEST: CPT | Mod: 26 | Performed by: INTERNAL MEDICINE

## 2019-01-04 ENCOUNTER — CARE COORDINATION (OUTPATIENT)
Dept: CARDIOLOGY | Facility: CLINIC | Age: 49
End: 2019-01-04

## 2019-01-04 NOTE — PROGRESS NOTES
CT Coronary Calcium scan completed 1/3/19 ordered by Dr. Santos noted. Total calcium score is 0, and no incidental findings noted on CT. Called pt and reviewed results, he stated understanding. Reviewed to keep visit with Dr. Santos next week on 1/11/19. Pt in agreement. ARTURO Shay 10:40 AM 01/04/19

## 2019-01-10 ENCOUNTER — HOSPITAL ENCOUNTER (OUTPATIENT)
Facility: CLINIC | Age: 49
Discharge: HOME | End: 2019-01-10
Attending: INTERNAL MEDICINE | Admitting: INTERNAL MEDICINE
Payer: COMMERCIAL

## 2019-01-10 VITALS
OXYGEN SATURATION: 96 % | RESPIRATION RATE: 16 BRPM | DIASTOLIC BLOOD PRESSURE: 78 MMHG | HEART RATE: 52 BPM | SYSTOLIC BLOOD PRESSURE: 137 MMHG

## 2019-01-10 LAB — COLONOSCOPY: NORMAL

## 2019-01-10 PROCEDURE — 25000128 H RX IP 250 OP 636: Performed by: INTERNAL MEDICINE

## 2019-01-10 PROCEDURE — 45385 COLONOSCOPY W/LESION REMOVAL: CPT | Performed by: INTERNAL MEDICINE

## 2019-01-10 PROCEDURE — 88305 TISSUE EXAM BY PATHOLOGIST: CPT | Mod: 26,59 | Performed by: INTERNAL MEDICINE

## 2019-01-10 PROCEDURE — G0500 MOD SEDAT ENDO SERVICE >5YRS: HCPCS | Performed by: INTERNAL MEDICINE

## 2019-01-10 PROCEDURE — 88305 TISSUE EXAM BY PATHOLOGIST: CPT | Performed by: INTERNAL MEDICINE

## 2019-01-10 RX ORDER — FENTANYL CITRATE 50 UG/ML
INJECTION, SOLUTION INTRAMUSCULAR; INTRAVENOUS PRN
Status: DISCONTINUED | OUTPATIENT
Start: 2019-01-10 | End: 2019-01-10 | Stop reason: HOSPADM

## 2019-01-11 ENCOUNTER — OFFICE VISIT (OUTPATIENT)
Dept: CARDIOLOGY | Facility: CLINIC | Age: 49
End: 2019-01-11
Attending: INTERNAL MEDICINE
Payer: COMMERCIAL

## 2019-01-11 VITALS
BODY MASS INDEX: 35.98 KG/M2 | DIASTOLIC BLOOD PRESSURE: 80 MMHG | HEART RATE: 66 BPM | HEIGHT: 71 IN | SYSTOLIC BLOOD PRESSURE: 128 MMHG | WEIGHT: 257 LBS

## 2019-01-11 DIAGNOSIS — R00.2 PALPITATIONS: ICD-10-CM

## 2019-01-11 DIAGNOSIS — I48.0 PAROXYSMAL ATRIAL FIBRILLATION (H): Primary | ICD-10-CM

## 2019-01-11 LAB
COPATH REPORT: NORMAL
TSH SERPL DL<=0.005 MIU/L-ACNC: 1.04 MU/L (ref 0.4–4)

## 2019-01-11 PROCEDURE — 36415 COLL VENOUS BLD VENIPUNCTURE: CPT | Performed by: INTERNAL MEDICINE

## 2019-01-11 PROCEDURE — 99214 OFFICE O/P EST MOD 30 MIN: CPT | Performed by: INTERNAL MEDICINE

## 2019-01-11 PROCEDURE — 93000 ELECTROCARDIOGRAM COMPLETE: CPT | Performed by: INTERNAL MEDICINE

## 2019-01-11 PROCEDURE — 84443 ASSAY THYROID STIM HORMONE: CPT | Performed by: INTERNAL MEDICINE

## 2019-01-11 ASSESSMENT — MIFFLIN-ST. JEOR: SCORE: 2047.61

## 2019-01-11 NOTE — LETTER
1/11/2019    Danyelle Larkin MD  [address] [facility] [address]  [facility] [address]    RE: Jamison Gerber       Dear Colleague,    I had the pleasure of seeing Jamison Gerber in the [facility].    CARDIOLOGY CLINIC VISIT    REASON FOR VISIT: Atrial fibrillation    SUBJECTIVE:  This is a very pleasant 48-year-old male who has a history of obesity and obstructive sleep apnea.  He has a family history of heart disease.  For that he saw me in December 2018 for a preventative visit.  During that time he expressed interest in getting a calcium score done.  This showed that his score was 0.  However at that time also complained of some very atypical sounding palpitations.  These happen only after hot shower.  This prompted a Holter monitor.  The Holter showed that he had new onset atrial fibrillation.  He was started on metoprolol in the interim and now he is here for follow-up.  He says ever since the metoprolol has been started, he has not had any further palpitations.  He feels well.  No cardiopulmonary limitations.  No angina syncope presyncope.  No heart failure symptoms.    MEDICATIONS:  Current Outpatient Medications   Medication     Cholecalciferol (VITAMIN D-3) 1000 units CAPS     metoprolol tartrate (LOPRESSOR) 25 MG tablet     Multiple Vitamins-Minerals (MULTIVITAMIN ADULT PO)     omega 3 1000 MG CAPS     Probiotic Product (PROBIOTIC DAILY PO)     PSYLLIUM HUSK PO     No current facility-administered medications for this visit.        ALLERGIES:  No Known Allergies    PHYSICAL EXAM:      BP: 128/80 Pulse: 66            Vital Signs with Ranges  Pulse:  [66] 66  BP: (128)/(80) 128/80  257 lbs 0 oz    Constitutional: awake, alert, no distress  Respiratory: Clear to auscultation bilaterally with good air entry.  Cardiovascular: Normal heart sounds.  GI: nondistended, nontender, bowel sounds present  Skin: dry, no rash  Musculoskeletal: good muscle tone, strength 5/5 in upper and lower  extremities  Neurologic: no focal deficits  Neuropsychiatric: appropriate affact    DATA:  Lab: Pertinent cardiac labs reviewed    ASSESSMENT:  Paroxysmal atrial fibrillation  Obesity  Obstructive sleep apnea    RECOMMENDATIONS:  This is a 48-year-old male without hypertension diabetes or cardiac disease who has atypical palpitations that prompted a Holter monitor which has shown new onset atrial fibrillation.  His risk score for stroke is 0.  Etiology of this is likely his weight and obstructive sleep apnea.  We will get thyroid function studies today.  If they are abnormal, refer to PCP for further work up. He has not had any further palpitations after metoprolol is been started.  Recommend metoprolol being continued at this time.  His calcium score is 0 as such I would not recommend statin therapy at this time.  He needs to work on losing weight, and eat better food.  He will see me back in 6 months.  I will also get a Holter monitor just to ensure adequate rate control with atrial fibrillation paroxysms on current doses of metoprolol.    Yumiko Santos MD, [facility]  Cardiology - [facility] Heart  January 11, 2019      Thank you for allowing me to participate in the care of your patient.    Sincerely,     Yumiko Santos MD     [facility]

## 2019-01-11 NOTE — PROGRESS NOTES
CARDIOLOGY CLINIC VISIT    REASON FOR VISIT: Atrial fibrillation    SUBJECTIVE:  This is a very pleasant 48-year-old male who has a history of obesity and obstructive sleep apnea.  He has a family history of heart disease.  For that he saw me in December 2018 for a preventative visit.  During that time he expressed interest in getting a calcium score done.  This showed that his score was 0.  However at that time also complained of some very atypical sounding palpitations.  These happen only after hot shower.  This prompted a Holter monitor.  The Holter showed that he had new onset atrial fibrillation.  He was started on metoprolol in the interim and now he is here for follow-up.  He says ever since the metoprolol has been started, he has not had any further palpitations.  He feels well.  No cardiopulmonary limitations.  No angina syncope presyncope.  No heart failure symptoms.    MEDICATIONS:  Current Outpatient Medications   Medication     Cholecalciferol (VITAMIN D-3) 1000 units CAPS     metoprolol tartrate (LOPRESSOR) 25 MG tablet     Multiple Vitamins-Minerals (MULTIVITAMIN ADULT PO)     omega 3 1000 MG CAPS     Probiotic Product (PROBIOTIC DAILY PO)     PSYLLIUM HUSK PO     No current facility-administered medications for this visit.        ALLERGIES:  No Known Allergies    PHYSICAL EXAM:      BP: 128/80 Pulse: 66            Vital Signs with Ranges  Pulse:  [66] 66  BP: (128)/(80) 128/80  257 lbs 0 oz    Constitutional: awake, alert, no distress  Respiratory: Clear to auscultation bilaterally with good air entry.  Cardiovascular: Normal heart sounds.  GI: nondistended, nontender, bowel sounds present  Skin: dry, no rash  Musculoskeletal: good muscle tone, strength 5/5 in upper and lower extremities  Neurologic: no focal deficits  Neuropsychiatric: appropriate affact    DATA:  Lab: Pertinent cardiac labs reviewed    ASSESSMENT:  Paroxysmal atrial fibrillation  Obesity  Obstructive sleep  apnea    RECOMMENDATIONS:  This is a 48-year-old male without hypertension diabetes or cardiac disease who has atypical palpitations that prompted a Holter monitor which has shown new onset atrial fibrillation.  His risk score for stroke is 0.  Etiology of this is likely his weight and obstructive sleep apnea.  We will get thyroid function studies today.  If they are abnormal, refer to PCP for further work up. He has not had any further palpitations after metoprolol is been started.  Recommend metoprolol being continued at this time.  His calcium score is 0 as such I would not recommend statin therapy at this time.  He needs to work on losing weight, and eat better food.  He will see me back in 6 months.  I will also get a Holter monitor just to ensure adequate rate control with atrial fibrillation paroxysms on current doses of metoprolol.    Yumiko Santos MD, [facility]  Cardiology - [facility] Heart  January 11, 2019

## 2019-01-11 NOTE — LETTER
1/11/2019    Danyelle Larkin MD  [address] [facility] [address]  [facility] [address]    RE: Jamison Gerber       Dear Colleague,    I had the pleasure of seeing Jamison Gerber in the [facility].    CARDIOLOGY CLINIC VISIT    REASON FOR VISIT: Atrial fibrillation    SUBJECTIVE:  This is a very pleasant 48-year-old male who has a history of obesity and obstructive sleep apnea.  He has a family history of heart disease.  For that he saw me in December 2018 for a preventative visit.  During that time he expressed interest in getting a calcium score done.  This showed that his score was 0.  However at that time also complained of some very atypical sounding palpitations.  These happen only after hot shower.  This prompted a Holter monitor.  The Holter showed that he had new onset atrial fibrillation.  He was started on metoprolol in the interim and now he is here for follow-up.  He says ever since the metoprolol has been started, he has not had any further palpitations.  He feels well.  No cardiopulmonary limitations.  No angina syncope presyncope.  No heart failure symptoms.    MEDICATIONS:  Current Outpatient Medications   Medication     Cholecalciferol (VITAMIN D-3) 1000 units CAPS     metoprolol tartrate (LOPRESSOR) 25 MG tablet     Multiple Vitamins-Minerals (MULTIVITAMIN ADULT PO)     omega 3 1000 MG CAPS     Probiotic Product (PROBIOTIC DAILY PO)     PSYLLIUM HUSK PO     No current facility-administered medications for this visit.        ALLERGIES:  No Known Allergies    PHYSICAL EXAM:      BP: 128/80 Pulse: 66            Vital Signs with Ranges  Pulse:  [66] 66  BP: (128)/(80) 128/80  257 lbs 0 oz    Constitutional: awake, alert, no distress  Respiratory: Clear to auscultation bilaterally with good air entry.  Cardiovascular: Normal heart sounds.  GI: nondistended, nontender, bowel sounds present  Skin: dry, no rash  Musculoskeletal: good muscle tone, strength 5/5 in upper and lower  extremities  Neurologic: no focal deficits  Neuropsychiatric: appropriate affact    DATA:  Lab: Pertinent cardiac labs reviewed    ASSESSMENT:  Paroxysmal atrial fibrillation  Obesity  Obstructive sleep apnea    RECOMMENDATIONS:  This is a 48-year-old male without hypertension diabetes or cardiac disease who has atypical palpitations that prompted a Holter monitor which has shown new onset atrial fibrillation.  His risk score for stroke is 0.  Etiology of this is likely his weight and obstructive sleep apnea.  We will get thyroid function studies today.  If they are abnormal, refer to PCP for further work up. He has not had any further palpitations after metoprolol is been started.  Recommend metoprolol being continued at this time.  His calcium score is 0 as such I would not recommend statin therapy at this time.  He needs to work on losing weight, and eat better food.  He will see me back in 6 months.  I will also get a Holter monitor just to ensure adequate rate control with atrial fibrillation paroxysms on current doses of metoprolol.    Yumiko Santos MD, [facility]  Cardiology - [facility]  January 11, 2019        Thank you for allowing me to participate in the care of your patient.      Sincerely,     Yumiko Santos MD     [facility]    cc:   Yumiko Santos MD  [address]  [place] [address]

## 2019-01-21 ENCOUNTER — HOSPITAL ENCOUNTER (OUTPATIENT)
Dept: CARDIOLOGY | Facility: CLINIC | Age: 49
Discharge: HOME | End: 2019-01-21
Attending: INTERNAL MEDICINE | Admitting: INTERNAL MEDICINE
Payer: COMMERCIAL

## 2019-01-21 DIAGNOSIS — I48.0 PAROXYSMAL ATRIAL FIBRILLATION (H): ICD-10-CM

## 2019-01-21 PROCEDURE — 93226 XTRNL ECG REC<48 HR SCAN A/R: CPT

## 2019-01-21 PROCEDURE — 93227 XTRNL ECG REC<48 HR R&I: CPT | Performed by: INTERNAL MEDICINE

## 2019-01-24 ENCOUNTER — CARE COORDINATION (OUTPATIENT)
Dept: CARDIOLOGY | Facility: CLINIC | Age: 49
End: 2019-01-24

## 2019-01-24 NOTE — PROGRESS NOTES
Repeat holter results below. Pt started metoprolol tartrate 25 mg BID on 1/2/19 for new p.AFib. Avg HR on the monitor was 76 bpm, min 48 and max 137 bpm. Also, 37 beat run of SVT at 136 bpm. See full summary below. Pt hit event button twice and was in SR with rates of 79 and 86 bpm at that time.    Will review with Dr. Santos.    Yesika Peasron RN January 24, 2019, 3:30 PM  RN Care Coordinator  [facility]

## 2019-01-25 NOTE — PROGRESS NOTES
Called pt, reviewed no episodes of Afib on the holter, but some early beats. Pt said he felt nothing while wearing the monitor and reports feeling well. Advised pt to call us if sx change or any concerns or questions, otherwise f/u in 6 months with Dr. Santos. Pt verbalized understanding.     Yumiko Santos MD   You 2 hours ago (8:03 AM)         Thank you. No AFib it seems. Continue current dose of BB therapy.         Documentation       You routed conversation to Yumiko Santos MD

## 2019-04-16 ENCOUNTER — TRANSFERRED RECORDS (OUTPATIENT)
Dept: HEALTH INFORMATION MANAGEMENT | Facility: CLINIC | Age: 49
End: 2019-04-16

## 2019-12-23 ENCOUNTER — OFFICE VISIT (OUTPATIENT)
Dept: CARDIOLOGY | Facility: CLINIC | Age: 49
End: 2019-12-23
Payer: COMMERCIAL

## 2019-12-23 VITALS
HEART RATE: 87 BPM | SYSTOLIC BLOOD PRESSURE: 129 MMHG | WEIGHT: 258 LBS | BODY MASS INDEX: 36.12 KG/M2 | HEIGHT: 71 IN | DIASTOLIC BLOOD PRESSURE: 98 MMHG

## 2019-12-23 DIAGNOSIS — I48.0 PAROXYSMAL ATRIAL FIBRILLATION (H): ICD-10-CM

## 2019-12-23 PROCEDURE — 93000 ELECTROCARDIOGRAM COMPLETE: CPT | Performed by: INTERNAL MEDICINE

## 2019-12-23 PROCEDURE — 99214 OFFICE O/P EST MOD 30 MIN: CPT | Mod: 25 | Performed by: INTERNAL MEDICINE

## 2019-12-23 RX ORDER — METOPROLOL SUCCINATE 50 MG/1
50 TABLET, EXTENDED RELEASE ORAL DAILY
Qty: 90 TABLET | Refills: 3 | Status: SHIPPED | OUTPATIENT
Start: 2019-12-23 | End: 2019-12-23

## 2019-12-23 RX ORDER — DILTIAZEM HYDROCHLORIDE 180 MG/1
180 CAPSULE, EXTENDED RELEASE ORAL DAILY
Qty: 30 CAPSULE | Refills: 11 | Status: SHIPPED | OUTPATIENT
Start: 2019-12-23 | End: 2020-12-10

## 2019-12-23 ASSESSMENT — MIFFLIN-ST. JEOR: SCORE: 2047.41

## 2019-12-23 NOTE — PROGRESS NOTES
CARDIOLOGY CLINIC CONSULTATION    REASON FOR CONSULT: Atrial fibrillation    PRIMARY CARE PHYSICIAN:  Danyelle Larkin    HISTORY OF PRESENT ILLNESS:  This is a 49-year-old gentleman who at first seen in December 2018.  He was initially seen me for cardiovascular screening and had a calcium score of 0.  He does have hyperlipidemia with his LDL of 139 back in December 2018.  At that time he also complained of atypical palpitations and Holter monitor had shown new onset atrial fibrillation.  His risk or for atrial fibrillation was 0 given the absence of diabetes coronary disease or hypertension or stroke.  He was put on metoprolol 25 mg twice daily.  He said his palpitations completely went away.  He says he takes metoprolol about 3 weeks out of a month.  The days he is noncompliant with it he gets more palpitations.  Otherwise does not feel atrial fibrillation.  Denies angina syncope presyncope or heart failure symptoms.  He says he has had some degree of erectile dysfunction for the last couple of years and is wondering if he can use Cialis.    PAST MEDICAL HISTORY:  Past Medical History:   Diagnosis Date     Family history of heart disease      Hyperlipidemia      Other abnormal glucose      Sleep apnea     uses CPap       MEDICATIONS:  Current Outpatient Medications   Medication     Cholecalciferol (VITAMIN D-3) 1000 units CAPS     diltiazem ER (DILT-XR) 180 MG 24 hr capsule     Multiple Vitamins-Minerals (MULTIVITAMIN ADULT PO)     omega 3 1000 MG CAPS     Probiotic Product (PROBIOTIC DAILY PO)     PSYLLIUM HUSK PO     No current facility-administered medications for this visit.        ALLERGIES:  No Known Allergies    SOCIAL HISTORY:  I have reviewed this patient's social history and updated it with pertinent information if needed. Jamison W Buzz  reports that he quit smoking about 7 years ago. His smoking use included cigarettes. He has a 10.00 pack-year smoking history. His smokeless tobacco use includes chew. He  reports current alcohol use. He reports current drug use. Drug: Marijuana.    FAMILY HISTORY:  I have reviewed this patient's family history and updated it with pertinent information if needed.   Family History   Problem Relation Age of Onset     Cerebrovascular Disease Father          age 54     Myocardial Infarction Father          at 54       REVIEW OF SYSTEMS:  Skin:  Negative for     Eyes:  Negative for    ENT:  not assessed    Respiratory:  Negative for    Cardiovascular:    palpitations;Positive for  Gastroenterology: Negative for    Genitourinary:  not assessed    Musculoskeletal:  not assessed    Neurologic:  not assessed    Psychiatric:  not assessed    Heme/Lymph/Imm:  not assessed    Endocrine:           PHYSICAL EXAM:      BP: (!) 156/106 Pulse: 87            Vital Signs with Ranges  Pulse:  [87] 87  BP: (156)/(106) 156/106  258 lbs 0 oz    Constitutional: awake, alert, no distress  Eyes: PERRL, sclera nonicteric  ENT: trachea midline  Respiratory: Clear  Cardiovascular: Irregular without murmur rubs or gallops  GI: nondistended, nontender, bowel sounds present  Lymph/Hematologic: no bleeding signs  Skin: dry, no rash  Musculoskeletal: good muscle tone, strength 5/5 in upper and lower extremities  Neurologic: no focal deficits  Neuropsychiatric: appropriate affact    DATA:  Labs: Pertinent cardiac labs reviewed    EKG: Atrial fibrillation    ASSESSMENT:  Paroxysmal atrial fibrillation  Elevated blood pressure in office today    RECOMMENDATIONS:  1. Patient is doing well from a cardiac standpoint.  He is asymptomatic from his atrial fibrillation episodes.  At this point I am not sure if he has hypertension even if he does not his risk or is 1.  We discussed risk and benefits of anticoagulation.  At this point we will hold off.  2. He needs to follow-up with his primary to ensure his blood pressure stays stable.  He was quite anxious and stressed prior to coming into the clinic because he went to  the wrong clinic building.  He thinks that is what has elevated his blood pressures.  I do not think he has a diagnosis of hypertension. Recheck BP came down from 150 to 129 systolic.  3. I will check a basic metabolic panel and lipid panel.  4. I have told him to eat healthy and lose weight.  5. Given his reported history of erectile dysfunction that may or may not be related to metoprolol, we will go ahead and discontinue metoprolol and switch him to diltiazem 180 mg daily.  6. We discussed that he is asymptomatic from AF standpoint but given young age, it would reasonable him to meet EP. Get echo, Zio and see EP in clinic for further discussions.     EVELYN Reyes, [facility]  Cardiology - [facility]  December 23, 2019

## 2019-12-23 NOTE — LETTER
12/23/2019    Danyelle Larkin MD  [address] [facility] [address]  [facility] [address]    RE: Jamison WHYTE Darenfrancy       Dear Colleague,    I had the pleasure of seeing Jamison W Buzz in the [facility].    CARDIOLOGY CLINIC CONSULTATION    REASON FOR CONSULT: Atrial fibrillation    PRIMARY CARE PHYSICIAN:  Danyelle Larkin    HISTORY OF PRESENT ILLNESS:  This is a 49-year-old gentleman who at first seen in December 2018.  He was initially seen me for cardiovascular screening and had a calcium score of 0.  He does have hyperlipidemia with his LDL of 139 back in December 2018.  At that time he also complained of atypical palpitations and Holter monitor had shown new onset atrial fibrillation.  His risk or for atrial fibrillation was 0 given the absence of diabetes coronary disease or hypertension or stroke.  He was put on metoprolol 25 mg twice daily.  He said his palpitations completely went away.  He says he takes metoprolol about 3 weeks out of a month.  The days he is noncompliant with it he gets more palpitations.  Otherwise does not feel atrial fibrillation.  Denies angina syncope presyncope or heart failure symptoms.  He says he has had some degree of erectile dysfunction for the last couple of years and is wondering if he can use Cialis.    PAST MEDICAL HISTORY:  Past Medical History:   Diagnosis Date     Family history of heart disease      Hyperlipidemia      Other abnormal glucose      Sleep apnea     uses CPap       MEDICATIONS:  Current Outpatient Medications   Medication     Cholecalciferol (VITAMIN D-3) 1000 units CAPS     diltiazem ER (DILT-XR) 180 MG 24 hr capsule     Multiple Vitamins-Minerals (MULTIVITAMIN ADULT PO)     omega 3 1000 MG CAPS     Probiotic Product (PROBIOTIC DAILY PO)     PSYLLIUM HUSK PO     No current facility-administered medications for this visit.        ALLERGIES:  No Known Allergies    SOCIAL HISTORY:  I have reviewed this patient's social history and updated it with  pertinent information if needed. Jamison Gerber  reports that he quit smoking about 7 years ago. His smoking use included cigarettes. He has a 10.00 pack-year smoking history. His smokeless tobacco use includes chew. He reports current alcohol use. He reports current drug use. Drug: Marijuana.    FAMILY HISTORY:  I have reviewed this patient's family history and updated it with pertinent information if needed.   Family History   Problem Relation Age of Onset     Cerebrovascular Disease Father          age 54     Myocardial Infarction Father          at 54       REVIEW OF SYSTEMS:  Skin:  Negative for     Eyes:  Negative for    ENT:  not assessed    Respiratory:  Negative for    Cardiovascular:    palpitations;Positive for  Gastroenterology: Negative for    Genitourinary:  not assessed    Musculoskeletal:  not assessed    Neurologic:  not assessed    Psychiatric:  not assessed    Heme/Lymph/Imm:  not assessed    Endocrine:           PHYSICAL EXAM:      BP: (!) 156/106 Pulse: 87            Vital Signs with Ranges  Pulse:  [87] 87  BP: (156)/(106) 156/106  258 lbs 0 oz    Constitutional: awake, alert, no distress  Eyes: PERRL, sclera nonicteric  ENT: trachea midline  Respiratory: Clear  Cardiovascular: Irregular without murmur rubs or gallops  GI: nondistended, nontender, bowel sounds present  Lymph/Hematologic: no bleeding signs  Skin: dry, no rash  Musculoskeletal: good muscle tone, strength 5/5 in upper and lower extremities  Neurologic: no focal deficits  Neuropsychiatric: appropriate affact    DATA:  Labs: Pertinent cardiac labs reviewed    EKG: Atrial fibrillation    ASSESSMENT:  Paroxysmal atrial fibrillation  Elevated blood pressure in office today    RECOMMENDATIONS:  1. Patient is doing well from a cardiac standpoint.  He is asymptomatic from his atrial fibrillation episodes.  At this point I am not sure if he has hypertension even if he does not his risk or is 1.  We discussed risk and benefits of  anticoagulation.  At this point we will hold off.  2. He needs to follow-up with his primary to ensure his blood pressure stays stable.  He was quite anxious and stressed prior to coming into the clinic because he went to the wrong clinic building.  He thinks that is what has elevated his blood pressures.  I do not think he has a diagnosis of hypertension. Recheck BP came down from 150 to 129 systolic.  3. I will check a basic metabolic panel and lipid panel.  4. I have told him to eat healthy and lose weight.  5. Given his reported history of erectile dysfunction that may or may not be related to metoprolol, we will go ahead and discontinue metoprolol and switch him to diltiazem 180 mg daily.  6. We discussed that he is asymptomatic from AF standpoint but given young age, it would reasonable him to meet EP. Get echoGilberto and see EP in clinic for further discussions.     EVELYN Reyes, [facility]  Cardiology - [facility] Heart  December 23, 2019      Thank you for allowing me to participate in the care of your patient.    Sincerely,     Yumiko Santos MD     [facility]

## 2020-01-06 ENCOUNTER — HOSPITAL ENCOUNTER (OUTPATIENT)
Dept: CARDIOLOGY | Facility: CLINIC | Age: 50
Discharge: HOME | End: 2020-01-06
Attending: INTERNAL MEDICINE | Admitting: INTERNAL MEDICINE
Payer: COMMERCIAL

## 2020-01-06 ENCOUNTER — CARE COORDINATION (OUTPATIENT)
Dept: CARDIOLOGY | Facility: CLINIC | Age: 50
End: 2020-01-06

## 2020-01-06 ENCOUNTER — OFFICE VISIT (OUTPATIENT)
Dept: FAMILY MEDICINE | Facility: CLINIC | Age: 50
End: 2020-01-06
Payer: COMMERCIAL

## 2020-01-06 VITALS
OXYGEN SATURATION: 96 % | WEIGHT: 253 LBS | HEART RATE: 78 BPM | DIASTOLIC BLOOD PRESSURE: 91 MMHG | SYSTOLIC BLOOD PRESSURE: 151 MMHG | HEIGHT: 71 IN | TEMPERATURE: 97.6 F | BODY MASS INDEX: 35.42 KG/M2

## 2020-01-06 DIAGNOSIS — G47.33 OSA (OBSTRUCTIVE SLEEP APNEA): ICD-10-CM

## 2020-01-06 DIAGNOSIS — I48.0 PAROXYSMAL ATRIAL FIBRILLATION (H): ICD-10-CM

## 2020-01-06 DIAGNOSIS — E78.5 HYPERLIPIDEMIA, UNSPECIFIED HYPERLIPIDEMIA TYPE: ICD-10-CM

## 2020-01-06 DIAGNOSIS — N52.9 ERECTILE DYSFUNCTION, UNSPECIFIED ERECTILE DYSFUNCTION TYPE: Primary | ICD-10-CM

## 2020-01-06 LAB
ANION GAP SERPL CALCULATED.3IONS-SCNC: 12.1 MMOL/L (ref 6–17)
BUN SERPL-MCNC: 21 MG/DL (ref 7–30)
CALCIUM SERPL-MCNC: 9.9 MG/DL (ref 8.5–10.5)
CHLORIDE SERPL-SCNC: 100 MMOL/L (ref 98–107)
CHOLEST SERPL-MCNC: 192 MG/DL
CO2 SERPL-SCNC: 29 MMOL/L (ref 23–29)
CREAT SERPL-MCNC: 1.16 MG/DL (ref 0.7–1.3)
GFR SERPL CREATININE-BSD FRML MDRD: 66 ML/MIN/{1.73_M2}
GLUCOSE SERPL-MCNC: 112 MG/DL (ref 70–105)
HDLC SERPL-MCNC: 55 MG/DL
LDLC SERPL CALC-MCNC: 104 MG/DL
NONHDLC SERPL-MCNC: 137 MG/DL
POTASSIUM SERPL-SCNC: 4.1 MMOL/L (ref 3.5–5.1)
SODIUM SERPL-SCNC: 137 MMOL/L (ref 136–145)
TRIGL SERPL-MCNC: 166 MG/DL

## 2020-01-06 PROCEDURE — 25500064 ZZH RX 255 OP 636: Performed by: INTERNAL MEDICINE

## 2020-01-06 PROCEDURE — 80048 BASIC METABOLIC PNL TOTAL CA: CPT | Performed by: INTERNAL MEDICINE

## 2020-01-06 PROCEDURE — 40000264 ECHOCARDIOGRAM COMPLETE

## 2020-01-06 PROCEDURE — 0298T ZIO PATCH HOLTER ADULT PEDIATRIC GREATER THAN 48 HRS: CPT | Performed by: INTERNAL MEDICINE

## 2020-01-06 PROCEDURE — 80061 LIPID PANEL: CPT | Performed by: INTERNAL MEDICINE

## 2020-01-06 PROCEDURE — 99214 OFFICE O/P EST MOD 30 MIN: CPT | Performed by: INTERNAL MEDICINE

## 2020-01-06 PROCEDURE — 0296T ZIO PATCH HOLTER ADULT PEDIATRIC GREATER THAN 48 HRS: CPT

## 2020-01-06 PROCEDURE — 93306 TTE W/DOPPLER COMPLETE: CPT | Mod: 26 | Performed by: INTERNAL MEDICINE

## 2020-01-06 PROCEDURE — 36415 COLL VENOUS BLD VENIPUNCTURE: CPT | Performed by: INTERNAL MEDICINE

## 2020-01-06 RX ORDER — TADALAFIL 5 MG/1
5 TABLET ORAL DAILY PRN
Qty: 30 TABLET | Refills: 11 | Status: SHIPPED | OUTPATIENT
Start: 2020-01-06 | End: 2020-01-06

## 2020-01-06 RX ORDER — TADALAFIL 5 MG/1
5 TABLET ORAL DAILY PRN
Qty: 30 TABLET | Refills: 11 | Status: SHIPPED | OUTPATIENT
Start: 2020-01-06 | End: 2021-02-11

## 2020-01-06 RX ADMIN — HUMAN ALBUMIN MICROSPHERES AND PERFLUTREN 6 ML: 10; .22 INJECTION, SOLUTION INTRAVENOUS at 13:08

## 2020-01-06 ASSESSMENT — MIFFLIN-ST. JEOR: SCORE: 2024.73

## 2020-01-06 NOTE — PROGRESS NOTES
Chief Complaint:       Jamison Gerber is a 49 year old male who presents to clinic today for the following health issues:      Follow up on multiple concerns including chronic ED        HPI:   Patient Jamison Gerber is a very pleasant 49 year old male with history of hyperlipidemia, ED, SEEMA who presents to Internal Medicine clinic today for follow up on multiple concerns including chronic ED. Regarding the patient's chronic ED, the patient complains of poorly controlled chronic ED symptoms. patient is not on any current ED medication. He requests a trial of Cialis ED medication. Regarding the patient's chronic SEEMA, the patient is compliant with his CPAP therapy. No chest pain, headaches, fever or chills.         Current Medications:     Current Outpatient Medications   Medication Sig Dispense Refill     tadalafil (CIALIS) 5 MG tablet Take 1 tablet (5 mg) by mouth daily as needed (ED) 30 tablet 11     Cholecalciferol (VITAMIN D-3) 1000 units CAPS Take 5,000 Units by mouth daily        diltiazem ER (DILT-XR) 180 MG 24 hr capsule Take 1 capsule (180 mg) by mouth daily 30 capsule 11     Multiple Vitamins-Minerals (MULTIVITAMIN ADULT PO)        omega 3 1000 MG CAPS Take by mouth daily        Probiotic Product (PROBIOTIC DAILY PO) Take by mouth daily        PSYLLIUM HUSK PO Take by mouth daily            Allergies:    No Known Allergies         Past Medical History:     Past Medical History:   Diagnosis Date     Family history of heart disease      Hyperlipidemia      Other abnormal glucose      Sleep apnea     uses CPap         Past Surgical History:     Past Surgical History:   Procedure Laterality Date     EXCISE LESION EYELID  2012    Procedure:EXCISE LESION EYELID; LEFT UPPER LID LESION EXCISION WITH SILICONE INTUBATION; Surgeon:MANUEL JIMENEZ; Location:[facility]         Family Medical History:     Family History   Problem Relation Age of Onset     Cerebrovascular Disease Father          age 54      Myocardial Infarction Father          at 54         Social History:     Social History     Socioeconomic History     Marital status: Single     Spouse name: Not on file     Number of children: Not on file     Years of education: Not on file     Highest education level: Not on file   Occupational History     Not on file   Social Needs     Financial resource strain: Not on file     Food insecurity:     Worry: Not on file     Inability: Not on file     Transportation needs:     Medical: Not on file     Non-medical: Not on file   Tobacco Use     Smoking status: Former Smoker     Packs/day: 0.50     Years: 20.00     Pack years: 10.00     Types: Cigarettes     Last attempt to quit: 2012     Years since quittin.9     Smokeless tobacco: Current User     Types: Chew   Substance and Sexual Activity     Alcohol use: Yes     Comment: 3-4 days per week, 2-3 drinks     Drug use: Yes     Types: Marijuana     Comment: couple times per week     Sexual activity: Yes     Partners: Female   Lifestyle     Physical activity:     Days per week: Not on file     Minutes per session: Not on file     Stress: Not on file   Relationships     Social connections:     Talks on phone: Not on file     Gets together: Not on file     Attends Mandaen service: Not on file     Active member of club or organization: Not on file     Attends meetings of clubs or organizations: Not on file     Relationship status: Not on file     Intimate partner violence:     Fear of current or ex partner: Not on file     Emotionally abused: Not on file     Physically abused: Not on file     Forced sexual activity: Not on file   Other Topics Concern     Parent/sibling w/ CABG, MI or angioplasty before 65F 55M? Yes   Social History Narrative     Not on file           Review of System:     Constitutional: Negative for fever or chills, positive for chronic SEEMA  Skin: Negative for rashes  Ears/Nose/Throat: Negative for nasal congestion, sore throat  Respiratory:  "No shortness of breath, dyspnea on exertion, cough, or hemoptysis  Cardiovascular: Negative for chest pain  Gastrointestinal: Negative for nausea, vomiting  Genitourinary: Negative for dysuria, hematuria, positive for chronic poorly controlled ED symptoms  Musculoskeletal: Negative for myalgias  Neurologic: Negative for headaches  Psychiatric: Negative for depression, anxiety  Hematologic/Lymphatic/Immunologic: Negative  Endocrine: Negative  Behavioral: Negative for tobacco use       Physical Exam:   BP (!) 151/91 (BP Location: Left arm, Patient Position: Sitting, Cuff Size: Adult Large)   Pulse 78   Temp 97.6  F (36.4  C) (Tympanic)   Ht 1.803 m (5' 11\")   Wt 114.8 kg (253 lb)   SpO2 96%   BMI 35.29 kg/m      GENERAL: alert and no distress  EYES: eyes grossly normal to inspection, and conjunctivae and sclerae normal  HENT: Normocephalic atraumatic. Nose and mouth without ulcers or lesions  NECK: supple  RESP: lungs clear to auscultation   CV: regular rate and rhythm, normal S1 S2  LYMPH: no peripheral edema   ABDOMEN: nondistended  MS: no gross musculoskeletal defects noted  SKIN: no suspicious lesions or rashes  NEURO: Alert & Oriented x 3.   PSYCH: mentation appears normal, affect normal        Diagnostic Test Results:     Diagnostic Test Results:  Results for orders placed or performed in visit on 01/06/20   Lipid Profile     Status: Abnormal   Result Value Ref Range    Cholesterol 192 <200 mg/dL    Triglycerides 166 (H) <150 mg/dL    HDL Cholesterol 55 >39 mg/dL    LDL Cholesterol Calculated 104 (H) <100 mg/dL    Non HDL Cholesterol 137 (H) <130 mg/dL   Basic metabolic panel     Status: Abnormal   Result Value Ref Range    Sodium 137 136 - 145 mmol/L    Potassium 4.1 3.5 - 5.1 mmol/L    Chloride 100 98 - 107 mmol/L    Carbon Dioxide 29 23 - 29 mmol/L    Anion Gap 12.1 6 - 17 mmol/L    Glucose 112 (H) 70 - 105 mg/dL    Urea Nitrogen 21 7 - 30 mg/dL    Creatinine 1.16 0.70 - 1.30 mg/dL    GFR Estimate 66 " >60 mL/min/[1.73_m2]    GFR Estimate If Black 80 >60 mL/min/[1.73_m2]    Calcium 9.9 8.5 - 10.5 mg/dL       ASSESSMENT/PLAN:       (N52.9) Erectile dysfunction, unspecified erectile dysfunction type  (primary encounter diagnosis)  Comment: poorly controlled chronic ED symptoms, patient is not currently on any ED medication.  Plan: tadalafil (CIALIS) 5 MG tablet      (E78.5) Hyperlipidemia, unspecified hyperlipidemia type  Comment: patient is compliant with his Omega 3 fish oil medication  Plan: continue current Omega 3 fish oil medication      (G47.33) SEEMA (obstructive sleep apnea)  Comment: patient is compliant with his CPAP therapy  Plan: continue current CPAP therapy for SEEMA treatment       Follow Up Plan:     Patient is instructed to return to Internal Medicine clinic for follow-up visit in 3 months.        Danyelle Larkin MD  Internal Medicine  [facility]

## 2020-01-06 NOTE — PROGRESS NOTES
Pt returning my call regarding lab results. Also, pt had Echo done today, and results now available.    Called pt back. No answer. Left detailed VM with results. Pt also has OV w/EP scheduled 2020 and Echo will be reviewed by MD at that time as well. Pt to call back if further questions or concerns. Yesika Pearson RN on 2020 at 3:35 PM      Component      Latest Ref Rng & Units 2018   Cholesterol      <200 mg/dL 205 (H) 192   Triglycerides      <150 mg/dL 109 166 (H)   HDL Cholesterol      >39 mg/dL 44 55   LDL Cholesterol Calculated      <100 mg/dL 139 (H) 104 (H)   Non HDL Cholesterol      <130 mg/dL 161 (H) 137 (H)       Component      Latest Ref Rng & Units 2018   Sodium      136 - 145 mmol/L 137 137   Potassium      3.5 - 5.1 mmol/L 4.3 4.1   Chloride      98 - 107 mmol/L 104 100   Carbon Dioxide      23 - 29 mmol/L 25 29   Anion Gap      6 - 17 mmol/L 8 12.1   Glucose      70 - 105 mg/dL 100 (H) 112 (H)   Urea Nitrogen      7 - 30 mg/dL 18 21   Creatinine      0.70 - 1.30 mg/dL 0.88 1.16   GFR Estimate      >60 mL/min/1.73:m2 >90 66   GFR Estimate If Black      >60 mL/min/1.73:m2 >90 80   Calcium      8.5 - 10.5 mg/dL 9.6 9.9         Echocardiogram Complete   Order: [id number]   Status:  Edited Result - FINAL   Visible to patient:  No (Not Released) Next appt:  2020 at 12:45 PM in Cardiology (Pia Donnelly MD) Dx:  Paroxysmal atrial fibrillation (H)   Details             Name: CYNTHIA GOODEN  MRN: [id number]  : 1970  Study Date: 2020 12:39 PM  Age: 49 yrs  Gender: Male  Patient Location: [facility]  Reason For Study: Paroxysmal atrial fibrillation (H)  Ordering Physician: MARICEL HERNÁNDEZ  Referring Physician: MARICEL HERNÁNDEZ  Performed By: Paty Byrd     BSA: 2.3 m2  Height: 71 in  Weight: 258 lb  HR: 88  _____________________________________________________________________________  __        Procedure  Complete Echo Adult. Optison (NDC #[id number]) given  intravenously.  _____________________________________________________________________________  __        Interpretation Summary     Left ventricular size, global systolic function, and wall motion are normal,  estimated LVEF 55-60%.  Right ventricular global function is normal.  No significant valvular abnormalities.  This study was compared to a prior TTE from 12/28/2018, the rhythm is now  atrial fibrillation.  _____________________________________________________________________________

## 2020-01-07 NOTE — PROGRESS NOTES
Pt returned the call. Reviewed his BMP and FLP lab and echo results done 1/6/2020. Pt was advised to review elevated fasting BG w/his PCP to see if follow up testing is needed. Pt was reminded to mail back his ZioPatch as soon as he finishes his last day wearing it, so that results will be back for his OV w/Dr. Donnelly on 2/6/2020. Yesika Pearson RN on 1/7/2020 at 11:38 AM

## 2020-01-17 ENCOUNTER — CARE COORDINATION (OUTPATIENT)
Dept: CARDIOLOGY | Facility: CLINIC | Age: 50
End: 2020-01-17

## 2020-01-17 NOTE — PROGRESS NOTES
"ZioPatch report for dates 1/6/2020 -1/13/2020 received. Preliminary findings report AF burden 100%. HR range  bpm, avg HR 99.  3 non-sustained episodes of VT reported, longest lasting 5 beats. Fastest lasting 4 beats at 231 bpm. ZafaroPaglen tracings/report was reviewed by DEBBIE Leong: no changes, follow up with EP as planned.     Report states \"MD notification criteria for Rapid A Fib met -  report posted prior to notification per account request (KJ).\"    I checked with EP to see if they were notified by Shawna and waiting on this report, but they were not.     Dr. Santos did review as stated above. Zio placed in bin for EP MD's to sign off per usual workflow.     Called placed to pt. Reviewed results/recommendations per Dr. Santos. Pt states he has \"felt his usual.\" He reports feeling better since changing from metoprolol to diltiazem. He denies any chest pain, SOB, dizziness/lightheadedness or rapid heart racing sensations. Pt reports on occasion he will note higher HR's (110s) on his FitBit even while sitting. He will drink some cold water and notes his HR will decrease.    Reminded pt to keep appt w/EP on 2/6/2020. Advised if any sx discussed to present to ED. Also to ED for any neurological symptoms, but reassured pt his CHADS-VASC is either 0 or 1 per Dr. Santos's OV note. Pt reiterated he wouldn't be interested in any anticoagulation.     Yesika Pearson RN on 1/17/2020 at 3:49 PM    "

## 2020-02-06 ENCOUNTER — OFFICE VISIT (OUTPATIENT)
Dept: CARDIOLOGY | Facility: CLINIC | Age: 50
End: 2020-02-06
Attending: INTERNAL MEDICINE
Payer: COMMERCIAL

## 2020-02-06 VITALS
HEART RATE: 75 BPM | SYSTOLIC BLOOD PRESSURE: 142 MMHG | WEIGHT: 249.7 LBS | BODY MASS INDEX: 34.96 KG/M2 | HEIGHT: 71 IN | DIASTOLIC BLOOD PRESSURE: 92 MMHG

## 2020-02-06 DIAGNOSIS — I48.19 PERSISTENT ATRIAL FIBRILLATION (H): ICD-10-CM

## 2020-02-06 PROCEDURE — 99214 OFFICE O/P EST MOD 30 MIN: CPT | Performed by: INTERNAL MEDICINE

## 2020-02-06 ASSESSMENT — MIFFLIN-ST. JEOR: SCORE: 2009.76

## 2020-02-06 NOTE — LETTER
2/6/2020      Danyelle Larkin MD  [address]  [facility] [address]      RE: Jamison Gerber       Dear Colleague,    I had the pleasure of seeing Jamison Gerber in the [facility].    Service Date: 02/06/2020      HISTORY OF PRESENT ILLNESS:  I had the pleasure of seeing Mr. Jamison Gerber, a delightful 49-year-old gentleman who has been referred by Dr. Santos for evaluation of persistent atrial fibrillation.      Mr. Gerber has history of obstructive sleep apnea for over 15 years.  He has been treated with CPAP.  He has been quite regular with the CPAP.  The patient complained of palpitation in late 2019.  He noted a heart rate sometimes in the 110s to 120s wearing his Fitbit.  When he first met Dr. Santos, he was in sinus rhythm.  Subsequent evaluation showed paroxysmal atrial fibrillation.  The patient had no symptoms and was treated with diltiazem for rate control.      The patient saw Dr. Santos again on 12/23/2019.  He was in atrial fibrillation that day.  Dr. Santos ordered  ZIO Patch monitor which showed persistent atrial fibrillation with a heart rate between  beats per minute, average of 99.  There were episodes of atrial fibrillation with rates exceeding 160.      Dr. Santos requested followup with EP.  In coming in today, Mr. Gerber states that he feels absolutely fine.  He is really not aware of his atrial fibrillation unless he checks his Fitbit.  The most strenuous thing he does is walking his dogs for about 1 hour every day and he can do that without a problem.  Walking up hills at a good pace makes him short of breath, but he does not believe it is any different compared to 1 year ago.  He admits being overweight and he attributes this mild breathlessness to his weight.       He has not had chest pain with exertion, syncope, near-syncope, orthopnea or PND.      He does not have family history of atrial fibrillation.  Overall, his father had coronary artery disease.   The patient is  and lives in [place].    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  He does not smoke tobacco, but smokes occasional marijuana.  He drinks modest alcohol (typically 2 beers) 3-4 times per week.      PHYSICAL EXAMINATION:   VITAL SIGNS:  Blood pressure 142/92, pulse 75 and irregular, weight 115 kilos, height 180 cm.   GENERAL:  He is a very pleasant man who is alert, oriented, in no distress.   HEENT:  Normocephalic, atraumatic.  Sclerae are anicteric.  Mouth, oropharynx clear.   NECK:  Supple without thyromegaly or lymphadenopathy.  No carotid bruits.   LUNGS:  Clear.  No crackles or wheeze.   CARDIOVASCULAR:  Normal JVP, irregularly irregular rhythm with controlled rate in the 80s.  No gallop, murmur, or rub.   ABDOMEN:  Moderately obese, soft, nontender.  Negative HJR.  No hepatosplenomegaly.   EXTREMITIES:  No clubbing, cyanosis or edema.   SKIN:  No rashes.   VASCULAR:  2+ carotid and radial and posterior tibial pulses.      DIAGNOSTIC STUDIES:  His most recent 12-lead ECG from 12/23/2019 showed atrial fibrillation with controlled ventricular rate.      For results of his recent cardiac monitor, see HPI.      Echocardiogram in early January showed normal LV function with EF of 55%-60%, normal RV systolic function with borderline right ventricular enlargement, normal atrial size.      IMPRESSION:   1.  Persistent atrial fibrillation.  Mr. Gerber is a delightful 49-year-old male with asymptomatic persistent atrial fibrillation.  It is interesting that the arrhythmia appeared to be paroxysmal 1 year ago, but sometime probably in the latter half of 2019 became persistent.  The patient has no awareness of atrial fibrillation unless he checks his Fitbit.      The most likely etiology for his atrial fibrillation includes sleep apnea.      I had a good discussion with Jamison.  In the absence of symptoms, I cannot recommend rhythm control therapy with an antiarrhythmic drug or catheter  ablation.  I explained that such therapy is not guaranteed to keep him in sinus rhythm and is associated with significant side effects.  It is possible that the treatment may be worse than the disease itself.  In general, in the absence of symptoms we cannot recommend rhythm control management.      With regard to anticoagulation, his CHADS2-VASc score is either 0 or 1 (borderline hypertension).  In either case, anticoagulation is not strongly recommended at this time.      I encouraged him to continue using CPAP and pay close attention to any change in symptoms such as development of fatigue or dyspnea on exertion.      RECOMMENDATIONS:   A.  Continue diltiazem for rate control.  No anticoagulation at the moment.   B.  See Dr. Santos in followup in 1 year.      I will be happy to see Jamison again in the future on an as-needed basis.        cc:   Yumiko Santos MD   [facility]    [address]   [place]  [address]      Danyelle Larkin MD   [facility]    [address]   [place]  [address]         TRUE RDZ MD             D: 2020   T: 2020   MT: RACHANA      Name:     JAMISON GOODEN   MRN:      [id number]        Account:      [id number]   :      1970           Service Date: 2020      Document: [id number]         Outpatient Encounter Medications as of 2020   Medication Sig Dispense Refill     Cholecalciferol (VITAMIN D-3) 1000 units CAPS Take 5,000 Units by mouth daily        diltiazem ER (DILT-XR) 180 MG 24 hr capsule Take 1 capsule (180 mg) by mouth daily 30 capsule 11     Multiple Vitamins-Minerals (MULTIVITAMIN ADULT PO)        omega 3 1000 MG CAPS Take by mouth daily        Probiotic Product (PROBIOTIC DAILY PO) Take by mouth daily        PSYLLIUM HUSK PO Take by mouth daily        tadalafil (CIALIS) 5 MG tablet Take 1 tablet (5 mg) by mouth daily as needed (ED) 30 tablet 11     No facility-administered  encounter medications on file as of 2/6/2020.                  Again, thank you for allowing me to participate in the care of your patient.      Sincerely,    Pia Donnelly MD     [facility]

## 2020-02-06 NOTE — PROGRESS NOTES
HPI and Plan:   See dictation    Orders Placed This Encounter   Procedures     Follow-Up with Cardiologist     EKG 12-lead complete w/read - Clinics (to be scheduled)       No orders of the defined types were placed in this encounter.      There are no discontinued medications.      Encounter Diagnosis   Name Primary?     Persistent atrial fibrillation        CURRENT MEDICATIONS:  Current Outpatient Medications   Medication Sig Dispense Refill     Cholecalciferol (VITAMIN D-3) 1000 units CAPS Take 5,000 Units by mouth daily        diltiazem ER (DILT-XR) 180 MG 24 hr capsule Take 1 capsule (180 mg) by mouth daily 30 capsule 11     Multiple Vitamins-Minerals (MULTIVITAMIN ADULT PO)        omega 3 1000 MG CAPS Take by mouth daily        Probiotic Product (PROBIOTIC DAILY PO) Take by mouth daily        PSYLLIUM HUSK PO Take by mouth daily        tadalafil (CIALIS) 5 MG tablet Take 1 tablet (5 mg) by mouth daily as needed (ED) 30 tablet 11       ALLERGIES   No Known Allergies    PAST MEDICAL HISTORY:  Past Medical History:   Diagnosis Date     Family history of heart disease      Hyperlipidemia      Other abnormal glucose      PAF (paroxysmal atrial fibrillation) (H)      Sleep apnea     uses CPap       PAST SURGICAL HISTORY:  Past Surgical History:   Procedure Laterality Date     EXCISE LESION EYELID  2012    Procedure:EXCISE LESION EYELID; LEFT UPPER LID LESION EXCISION WITH SILICONE INTUBATION; Surgeon:MANUEL JIMENEZ; Location:[facility]       FAMILY HISTORY:  Family History   Problem Relation Age of Onset     Cerebrovascular Disease Father          age 54     Myocardial Infarction Father          at 54       SOCIAL HISTORY:  Social History     Socioeconomic History     Marital status: Single     Spouse name: None     Number of children: None     Years of education: None     Highest education level: None   Occupational History     None   Social Needs     Financial resource strain: None     Food  insecurity:     Worry: None     Inability: None     Transportation needs:     Medical: None     Non-medical: None   Tobacco Use     Smoking status: Former Smoker     Packs/day: 0.50     Years: 20.00     Pack years: 10.00     Types: Cigarettes     Last attempt to quit: 2012     Years since quittin.0     Smokeless tobacco: Current User     Types: Chew   Substance and Sexual Activity     Alcohol use: Yes     Comment: 3-4 days per week, 2-3 drinks     Drug use: Yes     Types: Marijuana     Comment: couple times per week     Sexual activity: Yes     Partners: Female   Lifestyle     Physical activity:     Days per week: None     Minutes per session: None     Stress: None   Relationships     Social connections:     Talks on phone: None     Gets together: None     Attends Spiritism service: None     Active member of club or organization: None     Attends meetings of clubs or organizations: None     Relationship status: None     Intimate partner violence:     Fear of current or ex partner: None     Emotionally abused: None     Physically abused: None     Forced sexual activity: None   Other Topics Concern     Parent/sibling w/ CABG, MI or angioplasty before 65F 55M? Yes   Social History Narrative     None       Review of Systems:  Skin:  Negative for       Eyes:  Negative for   reading glasses  ENT:  Negative      Respiratory:  Positive for sleep apnea;CPAP     Cardiovascular:  Negative      Gastroenterology: Negative      Genitourinary:  Negative      Musculoskeletal:  Negative      Neurologic:  Negative      Psychiatric:  Negative      Heme/Lymph/Imm:  Negative      Endocrine:  Negative        674???  HPI and Plan:   See dictation    Orders Placed This Encounter   Procedures     Follow-Up with Cardiologist     EKG 12-lead complete w/read - Clinics (to be scheduled)       No orders of the defined types were placed in this encounter.      There are no discontinued medications.      Encounter Diagnosis   Name  Primary?     Persistent atrial fibrillation        CURRENT MEDICATIONS:  Current Outpatient Medications   Medication Sig Dispense Refill     Cholecalciferol (VITAMIN D-3) 1000 units CAPS Take 5,000 Units by mouth daily        diltiazem ER (DILT-XR) 180 MG 24 hr capsule Take 1 capsule (180 mg) by mouth daily 30 capsule 11     Multiple Vitamins-Minerals (MULTIVITAMIN ADULT PO)        omega 3 1000 MG CAPS Take by mouth daily        Probiotic Product (PROBIOTIC DAILY PO) Take by mouth daily        PSYLLIUM HUSK PO Take by mouth daily        tadalafil (CIALIS) 5 MG tablet Take 1 tablet (5 mg) by mouth daily as needed (ED) 30 tablet 11       ALLERGIES   No Known Allergies    PAST MEDICAL HISTORY:  Past Medical History:   Diagnosis Date     Family history of heart disease      Hyperlipidemia      Other abnormal glucose      PAF (paroxysmal atrial fibrillation) (H)      Sleep apnea     uses CPap       PAST SURGICAL HISTORY:  Past Surgical History:   Procedure Laterality Date     EXCISE LESION EYELID  2012    Procedure:EXCISE LESION EYELID; LEFT UPPER LID LESION EXCISION WITH SILICONE INTUBATION; Surgeon:MANUEL JIMENEZ; Location:[facility]       FAMILY HISTORY:  Family History   Problem Relation Age of Onset     Cerebrovascular Disease Father          age 54     Myocardial Infarction Father          at 54       SOCIAL HISTORY:  Social History     Socioeconomic History     Marital status: Single     Spouse name: None     Number of children: None     Years of education: None     Highest education level: None   Occupational History     None   Social Needs     Financial resource strain: None     Food insecurity:     Worry: None     Inability: None     Transportation needs:     Medical: None     Non-medical: None   Tobacco Use     Smoking status: Former Smoker     Packs/day: 0.50     Years: 20.00     Pack years: 10.00     Types: Cigarettes     Last attempt to quit: 2012     Years since quittin.0      "Smokeless tobacco: Current User     Types: Chew   Substance and Sexual Activity     Alcohol use: Yes     Comment: 3-4 days per week, 2-3 drinks     Drug use: Yes     Types: Marijuana     Comment: couple times per week     Sexual activity: Yes     Partners: Female   Lifestyle     Physical activity:     Days per week: None     Minutes per session: None     Stress: None   Relationships     Social connections:     Talks on phone: None     Gets together: None     Attends Mandaeism service: None     Active member of club or organization: None     Attends meetings of clubs or organizations: None     Relationship status: None     Intimate partner violence:     Fear of current or ex partner: None     Emotionally abused: None     Physically abused: None     Forced sexual activity: None   Other Topics Concern     Parent/sibling w/ CABG, MI or angioplasty before 65F 55M? Yes   Social History Narrative     None       Review of Systems:  Skin:  Negative for       Eyes:  Negative for   reading glasses  ENT:  Negative      Respiratory:  Positive for sleep apnea;CPAP     Cardiovascular:  Negative      Gastroenterology: Negative      Genitourinary:  Negative      Musculoskeletal:  Negative      Neurologic:  Negative      Psychiatric:  Negative      Heme/Lymph/Imm:  Negative      Endocrine:  Negative        Physical Exam:  Vitals: BP (!) 142/92   Pulse 75   Ht 1.803 m (5' 11\")   Wt 113.3 kg (249 lb 11.2 oz)   BMI 34.83 kg/m      Constitutional:           Skin:             Head:           Eyes:           Lymph:      ENT:           Neck:           Respiratory:            Cardiac:                                                           GI:           Extremities and Muscular Skeletal:                 Neurological:           Psych:           CC  Yumiko Santos MD  [address]  [place] [address]                        "

## 2020-02-06 NOTE — PROGRESS NOTES
Service Date: 02/06/2020      HISTORY OF PRESENT ILLNESS:    I had the pleasure of seeing Mr. Jamison Gerber, a delightful 49-year-old gentleman who has been referred by Dr. Santos for evaluation of persistent atrial fibrillation.      Mr. Gerber has had obstructive sleep apnea for over 15 years.  He uses CPAP regularly.  The patient first complained of palpitation in late 2019.  He noted occasional heart rates in the 110s to 120s while wearing his Fitbit.  When he first saw Dr. Santos he was in sinus rhythm.  Subsequent evaluation showed paroxysmal atrial fibrillation.  The patient had no symptoms and was treated with diltiazem for rate control.      The patient saw Dr. Santos again on 12/23/2019.  He was in atrial fibrillation that day.  Dr. Santos ordered a cardiac monitor which showed persistent atrial fibrillation with heart rates between  beats per minute, average of 99.  There were several episodes of atrial fibrillation with rates exceeding 160.      Dr. Santos requested followup with EP.  In coming in today, Mr. Gerber states that he feels absolutely fine.  He is not aware of his atrial fibrillation unless he checks his Fitbit.  The most strenuous thing he does is walking his dogs for 1 hour every day and he can do that without problems.  Walking up hills at a fast pace makes him short of breath, but he does not believe this is any different compared to 1 or 2 years ago.  He admits being overweight and attributes this mild breathlessness to his weight.       He has not had chest pain with exertion, syncope, near-syncope, orthopnea or PND.      He does not have family history of atrial fibrillation.  Overall, his father had coronary artery disease.  The patient is  and lives in [place].  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  He does not smoke tobacco, but smokes occasional marijuana.  He drinks modest alcohol (typically 2 beers) 3-4 times per week.        PHYSICAL EXAMINATION:   VITAL  SIGNS:  Blood pressure 142/92, pulse 75 and irregular, weight 115 kilos, height 180 cm.   GENERAL:  He is a very pleasant man who is alert, oriented, in no distress.   HEENT:  Normocephalic, atraumatic.  Sclerae are anicteric.  Mouth, oropharynx clear.   NECK:  Supple without thyromegaly or lymphadenopathy.  No carotid bruits.   LUNGS:  Clear.  No crackles or wheeze.   CARDIOVASCULAR:  Normal JVP, irregularly irregular rhythm with controlled rate in the 80s.  No gallop, murmur, or rub.   ABDOMEN:  Moderately obese, soft, nontender.  Negative HJR.  No hepatosplenomegaly.   EXTREMITIES:  No clubbing, cyanosis or edema.   SKIN:  No rashes.   VASCULAR:  2+ carotid and radial and posterior tibial pulses.        DIAGNOSTIC STUDIES:    - His most recent 12-lead ECG from 12/23/2019 showed atrial fibrillation with controlled ventricular rate.   - For results of his recent cardiac monitor, see HPI.   - Echocardiogram in 01/2020 showed normal LV function with EF of 55%-60%, normal RV systolic function with borderline right ventricular enlargement, normal atrial size.        IMPRESSION:   1.  Persistent atrial fibrillation.  Mr. Gerber is a delightful 49-year-old male with asymptomatic persistent atrial fibrillation.  The arrhythmia appeared to be paroxysmal 1 year ago, but in the latter half of 2019 became persistent.  The patient has no awareness of atrial fibrillation unless he checks his Fitbit.      The most likely etiology for his atrial fibrillation is sleep apnea.      I had a good discussion with Jamison.  In the absence of symptoms, I cannot recommend rhythm control with an antiarrhythmic drug or catheter ablation.  I explained that such therapy is not guaranteed to keep him in sinus rhythm and may cause significant side-effects.  In his case, treatment may be worse than the disease itself.      With regard to anticoagulation, his [id number] score is either 0 or 1 (borderline hypertension).  In either case,  anticoagulation is not strongly recommended at this time.      I encouraged him to continue using CPAP and pay close attention to a potential change in symptoms, such as development of fatigue or worse dyspnea on exertion.      RECOMMENDATIONS:   A.  Continue diltiazem for rate control.  No anticoagulation for now.   B.  See Dr. Santos in follow-up in 1 year.      I will be happy to see Jamison again in the future on an as-needed basis.        TRUE RDZ MD, FACC           cc:   Yumiko Santos MD   [facility]    [address]   [place]  [address]      Danyelle Larkin MD   [facility]    [address]   [place]  [address]          D: 2020   T: 2020   MT: RACHANA      Name:     JAMISON GOODEN   MRN:      [id number]        Account:      [id number]   :      1970           Service Date: 2020      Document: [id number]

## 2020-11-30 NOTE — LETTER
2020    Danyelle Larkin MD  [address] [facility] [address]  [facility] [address]    RE: Jamison ISABELL Buzz       Dear Colleague,    I had the pleasure of seeing Jamison Gerber in the [facility].    HPI and Plan:   See dictation    Orders Placed This Encounter   Procedures     Follow-Up with Cardiologist     EKG 12-lead complete w/read - Clinics (to be scheduled)       No orders of the defined types were placed in this encounter.      There are no discontinued medications.      Encounter Diagnosis   Name Primary?     Persistent atrial fibrillation        CURRENT MEDICATIONS:  Current Outpatient Medications   Medication Sig Dispense Refill     Cholecalciferol (VITAMIN D-3) 1000 units CAPS Take 5,000 Units by mouth daily        diltiazem ER (DILT-XR) 180 MG 24 hr capsule Take 1 capsule (180 mg) by mouth daily 30 capsule 11     Multiple Vitamins-Minerals (MULTIVITAMIN ADULT PO)        omega 3 1000 MG CAPS Take by mouth daily        Probiotic Product (PROBIOTIC DAILY PO) Take by mouth daily        PSYLLIUM HUSK PO Take by mouth daily        tadalafil (CIALIS) 5 MG tablet Take 1 tablet (5 mg) by mouth daily as needed (ED) 30 tablet 11       ALLERGIES   No Known Allergies    PAST MEDICAL HISTORY:  Past Medical History:   Diagnosis Date     Family history of heart disease      Hyperlipidemia      Other abnormal glucose      PAF (paroxysmal atrial fibrillation) (H)      Sleep apnea     uses CPap       PAST SURGICAL HISTORY:  Past Surgical History:   Procedure Laterality Date     EXCISE LESION EYELID  2012    Procedure:EXCISE LESION EYELID; LEFT UPPER LID LESION EXCISION WITH SILICONE INTUBATION; Surgeon:MANUEL JIMENEZ; Location:[facility]       FAMILY HISTORY:  Family History   Problem Relation Age of Onset     Cerebrovascular Disease Father          age 54     Myocardial Infarction Father          at 54       SOCIAL HISTORY:  Social History     Socioeconomic History     Marital status: Single     Your COVID 19 test can take 3-5 days for the results come back. We ask that you make a Mychart page and view your test results this way. You will need to Self quarantine until you know your results. Increase fluids rest  Saline nasal spray as directed  Warm salt gargles for throat discomfort  Monitor temperature twice a day  Tylenol for fevers and/or discomfort. If symptoms are worse -Go to the ER. Follow up with your primary doctor    To Whom it May Concern:    Brielle Webber has been tested for COVID on 11/30/20. They may NOT return to work until their lab test results back and they been fever free for 3 days. If test is positive they must stay home for 2 weeks or until they test negative or as directed by the [facility] Department.  Spouse name: None     Number of children: None     Years of education: None     Highest education level: None   Occupational History     None   Social Needs     Financial resource strain: None     Food insecurity:     Worry: None     Inability: None     Transportation needs:     Medical: None     Non-medical: None   Tobacco Use     Smoking status: Former Smoker     Packs/day: 0.50     Years: 20.00     Pack years: 10.00     Types: Cigarettes     Last attempt to quit: 2012     Years since quittin.0     Smokeless tobacco: Current User     Types: Chew   Substance and Sexual Activity     Alcohol use: Yes     Comment: 3-4 days per week, 2-3 drinks     Drug use: Yes     Types: Marijuana     Comment: couple times per week     Sexual activity: Yes     Partners: Female   Lifestyle     Physical activity:     Days per week: None     Minutes per session: None     Stress: None   Relationships     Social connections:     Talks on phone: None     Gets together: None     Attends Hindu service: None     Active member of club or organization: None     Attends meetings of clubs or organizations: None     Relationship status: None     Intimate partner violence:     Fear of current or ex partner: None     Emotionally abused: None     Physically abused: None     Forced sexual activity: None   Other Topics Concern     Parent/sibling w/ CABG, MI or angioplasty before 65F 55M? Yes   Social History Narrative     None       Review of Systems:  Skin:  Negative for       Eyes:  Negative for   reading glasses  ENT:  Negative      Respiratory:  Positive for sleep apnea;CPAP     Cardiovascular:  Negative      Gastroenterology: Negative      Genitourinary:  Negative      Musculoskeletal:  Negative      Neurologic:  Negative      Psychiatric:  Negative      Heme/Lymph/Imm:  Negative      Endocrine:  Negative        674???  HPI and Plan:   See dictation    Orders Placed This Encounter   Procedures     Follow-Up with Cardiologist     EKG  12-lead complete w/read - Clinics (to be scheduled)       No orders of the defined types were placed in this encounter.      There are no discontinued medications.      Encounter Diagnosis   Name Primary?     Persistent atrial fibrillation        CURRENT MEDICATIONS:  Current Outpatient Medications   Medication Sig Dispense Refill     Cholecalciferol (VITAMIN D-3) 1000 units CAPS Take 5,000 Units by mouth daily        diltiazem ER (DILT-XR) 180 MG 24 hr capsule Take 1 capsule (180 mg) by mouth daily 30 capsule 11     Multiple Vitamins-Minerals (MULTIVITAMIN ADULT PO)        omega 3 1000 MG CAPS Take by mouth daily        Probiotic Product (PROBIOTIC DAILY PO) Take by mouth daily        PSYLLIUM HUSK PO Take by mouth daily        tadalafil (CIALIS) 5 MG tablet Take 1 tablet (5 mg) by mouth daily as needed (ED) 30 tablet 11       ALLERGIES   No Known Allergies    PAST MEDICAL HISTORY:  Past Medical History:   Diagnosis Date     Family history of heart disease      Hyperlipidemia      Other abnormal glucose      PAF (paroxysmal atrial fibrillation) (H)      Sleep apnea     uses CPap       PAST SURGICAL HISTORY:  Past Surgical History:   Procedure Laterality Date     EXCISE LESION EYELID  2012    Procedure:EXCISE LESION EYELID; LEFT UPPER LID LESION EXCISION WITH SILICONE INTUBATION; Surgeon:MANUEL JIMENEZ; [facility]       FAMILY HISTORY:  Family History   Problem Relation Age of Onset     Cerebrovascular Disease Father          age 54     Myocardial Infarction Father          at 54       SOCIAL HISTORY:  Social History     Socioeconomic History     Marital status: Single     Spouse name: None     Number of children: None     Years of education: None     Highest education level: None   Occupational History     None   Social Needs     Financial resource strain: None     Food insecurity:     Worry: None     Inability: None     Transportation needs:     Medical: None     Non-medical: None   Tobacco  "Use     Smoking status: Former Smoker     Packs/day: 0.50     Years: 20.00     Pack years: 10.00     Types: Cigarettes     Last attempt to quit: 2012     Years since quittin.0     Smokeless tobacco: Current User     Types: Chew   Substance and Sexual Activity     Alcohol use: Yes     Comment: 3-4 days per week, 2-3 drinks     Drug use: Yes     Types: Marijuana     Comment: couple times per week     Sexual activity: Yes     Partners: Female   Lifestyle     Physical activity:     Days per week: None     Minutes per session: None     Stress: None   Relationships     Social connections:     Talks on phone: None     Gets together: None     Attends Adventism service: None     Active member of club or organization: None     Attends meetings of clubs or organizations: None     Relationship status: None     Intimate partner violence:     Fear of current or ex partner: None     Emotionally abused: None     Physically abused: None     Forced sexual activity: None   Other Topics Concern     Parent/sibling w/ CABG, MI or angioplasty before 65F 55M? Yes   Social History Narrative     None       Review of Systems:  Skin:  Negative for       Eyes:  Negative for   reading glasses  ENT:  Negative      Respiratory:  Positive for sleep apnea;CPAP     Cardiovascular:  Negative      Gastroenterology: Negative      Genitourinary:  Negative      Musculoskeletal:  Negative      Neurologic:  Negative      Psychiatric:  Negative      Heme/Lymph/Imm:  Negative      Endocrine:  Negative        Physical Exam:  Vitals: BP (!) 142/92   Pulse 75   Ht 1.803 m (5' 11\")   Wt 113.3 kg (249 lb 11.2 oz)   BMI 34.83 kg/m       Constitutional:           Skin:             Head:           Eyes:           Lymph:      ENT:           Neck:           Respiratory:            Cardiac:                                                           GI:           Extremities and Muscular Skeletal:                 Neurological:           Psych:     "       CC  Yumiko Santos MD  [address]  [place] [id number]                          Thank you for allowing me to participate in the care of your patient.      Sincerely,     Pia Donnelly MD     [facility]    cc:   Yumiko Satnos MD  [address]  [place] [id number]

## 2020-12-10 DIAGNOSIS — I48.0 PAROXYSMAL ATRIAL FIBRILLATION (H): ICD-10-CM

## 2020-12-10 RX ORDER — DILTIAZEM HYDROCHLORIDE 180 MG/1
180 CAPSULE, EXTENDED RELEASE ORAL DAILY
Qty: 90 CAPSULE | Refills: 0 | Status: SHIPPED | OUTPATIENT
Start: 2020-12-10 | End: 2021-03-04

## 2021-02-24 ENCOUNTER — MYC REFILL (OUTPATIENT)
Dept: FAMILY MEDICINE | Facility: CLINIC | Age: 51
End: 2021-02-24

## 2021-02-24 DIAGNOSIS — N52.9 ERECTILE DYSFUNCTION, UNSPECIFIED ERECTILE DYSFUNCTION TYPE: ICD-10-CM

## 2021-02-24 RX ORDER — TADALAFIL 5 MG/1
TABLET ORAL
Qty: 5 TABLET | Refills: 0 | OUTPATIENT
Start: 2021-02-24

## 2021-02-25 ENCOUNTER — MYC MEDICAL ADVICE (OUTPATIENT)
Dept: FAMILY MEDICINE | Facility: CLINIC | Age: 51
End: 2021-02-25

## 2021-02-25 DIAGNOSIS — N52.9 ERECTILE DYSFUNCTION, UNSPECIFIED ERECTILE DYSFUNCTION TYPE: ICD-10-CM

## 2021-02-26 RX ORDER — TADALAFIL 5 MG/1
TABLET ORAL
Qty: 5 TABLET | Refills: 0 | Status: CANCELLED | OUTPATIENT
Start: 2021-02-26

## 2021-03-01 ENCOUNTER — VIRTUAL VISIT (OUTPATIENT)
Dept: FAMILY MEDICINE | Facility: CLINIC | Age: 51
End: 2021-03-01
Payer: COMMERCIAL

## 2021-03-01 DIAGNOSIS — G47.33 OSA (OBSTRUCTIVE SLEEP APNEA): ICD-10-CM

## 2021-03-01 DIAGNOSIS — N52.9 ERECTILE DYSFUNCTION, UNSPECIFIED ERECTILE DYSFUNCTION TYPE: Primary | ICD-10-CM

## 2021-03-01 DIAGNOSIS — E78.5 HYPERLIPIDEMIA, UNSPECIFIED HYPERLIPIDEMIA TYPE: ICD-10-CM

## 2021-03-01 PROCEDURE — 99214 OFFICE O/P EST MOD 30 MIN: CPT | Mod: GT | Performed by: INTERNAL MEDICINE

## 2021-03-01 RX ORDER — TADALAFIL 5 MG/1
TABLET ORAL
Qty: 30 TABLET | Refills: 11 | Status: SHIPPED | OUTPATIENT
Start: 2021-03-01 | End: 2023-02-02

## 2021-03-01 NOTE — PROGRESS NOTES
Jamison is a 50 year old who is being evaluated via a billable telephone visit.      How would you like to obtain your AVS? Allison  cell phone: [phone]        Chief Complaint:       Jamison Gerber is a 50 year old male who presents to clinic today for the following health issues:      Follow up on multiple concerns including chronic ED        HPI:   Patient Jamison Gerber is a very pleasant 50 year old male with history of hyperlipidemia, ED, SEEMA who presents to Internal Medicine clinic today for follow up on multiple concerns including chronic ED. Regarding the patient's chronic ED, the patient reports that his chronic ED symptoms are improved with the Cialis ED medicaiton. patient is due for a refill of the [facility] ED medication at this time. Regarding the patient's chronic SEEMA, the patient is compliant with his CPAP therapy. No chest pain, headaches, fever or chills.         Current Medications:     Current Outpatient Medications   Medication Sig Dispense Refill     Cholecalciferol (VITAMIN D-3) 1000 units CAPS Take 5,000 Units by mouth daily        diltiazem ER (DILT-XR) 180 MG 24 hr capsule Take 1 capsule (180 mg) by mouth daily 90 capsule 0     Multiple Vitamins-Minerals (MULTIVITAMIN ADULT PO)        omega 3 1000 MG CAPS Take by mouth daily        Probiotic Product (PROBIOTIC DAILY PO) Take by mouth daily        PSYLLIUM HUSK PO Take by mouth daily        tadalafil (CIALIS) 5 MG tablet TAKE ONE TABLET BY MOUTH DAILY AS NEEDED FOR ED 30 tablet 11         Allergies:    No Known Allergies         Past Medical History:     Past Medical History:   Diagnosis Date     Family history of heart disease      Hyperlipidemia      Other abnormal glucose      PAF (paroxysmal atrial fibrillation) (H)      Sleep apnea     uses CPap         Past Surgical History:     Past Surgical History:   Procedure Laterality Date     EXCISE LESION EYELID  1/27/2012    Procedure:EXCISE LESION EYELID; LEFT UPPER LID LESION EXCISION WITH  SILICONE INTUBATION; Surgeon:MANUEL JIMENEZ; Location:[facility]         Family Medical History:     Family History   Problem Relation Age of Onset     Cerebrovascular Disease Father          age 54     Myocardial Infarction Father          at 54         Social History:     Social History     Socioeconomic History     Marital status: Single     Spouse name: Not on file     Number of children: Not on file     Years of education: Not on file     Highest education level: Not on file   Occupational History     Not on file   Social Needs     Financial resource strain: Not on file     Food insecurity     Worry: Not on file     Inability: Not on file     Transportation needs     Medical: Not on file     Non-medical: Not on file   Tobacco Use     Smoking status: Former Smoker     Packs/day: 0.50     Years: 20.00     Pack years: 10.00     Types: Cigarettes     Quit date: 2012     Years since quittin.1     Smokeless tobacco: Current User     Types: Chew   Substance and Sexual Activity     Alcohol use: Yes     Comment: 3-4 days per week, 2-3 drinks     Drug use: Yes     Types: Marijuana     Comment: couple times per week     Sexual activity: Yes     Partners: Female   Lifestyle     Physical activity     Days per week: Not on file     Minutes per session: Not on file     Stress: Not on file   Relationships     Social connections     Talks on phone: Not on file     Gets together: Not on file     Attends Congregation service: Not on file     Active member of club or organization: Not on file     Attends meetings of clubs or organizations: Not on file     Relationship status: Not on file     Intimate partner violence     Fear of current or ex partner: Not on file     Emotionally abused: Not on file     Physically abused: Not on file     Forced sexual activity: Not on file   Other Topics Concern     Parent/sibling w/ CABG, MI or angioplasty before 65F 55M? Yes   Social History Narrative     Not on file            Review of System:     Constitutional: Negative for fever or chills, positive for chronic SEEMA  Skin: Negative for rashes  Ears/Nose/Throat: Negative for nasal congestion, sore throat  Respiratory: No shortness of breath, dyspnea on exertion, cough, or hemoptysis  Cardiovascular: Negative for chest pain  Gastrointestinal: Negative for nausea, vomiting  Genitourinary: Negative for dysuria, hematuria, positive for chronic ED symptoms  Musculoskeletal: Negative for myalgias  Neurologic: Negative for headaches  Psychiatric: Negative for depression, anxiety  Hematologic/Lymphatic/Immunologic: Negative  Endocrine: Negative  Behavioral: Negative for tobacco use       Physical Exam:   There were no vitals taken for this visit.    RESP: no cough over the phone   NEURO: Alert & Oriented x 3   PSYCH: mentation appears normal, affect normal        Diagnostic Test Results:     Diagnostic Test Results:  Labs reviewed in Epic      ASSESSMENT/PLAN:       (N52.9) Erectile dysfunction, unspecified erectile dysfunction type  (primary encounter diagnosis)  Comment: stable chronic ED symptoms, patient is due for a refill of the Cialis ED medication at this time.  Plan: tadalafil (CIALIS) 5 MG tablet      (E78.5) Hyperlipidemia, unspecified hyperlipidemia type  Comment: stable, patient is compliant with his Omega 3 fish oil medication  Plan: continue current Omega 3 fish oil medication      (G47.33) SEEMA (obstructive sleep apnea)  Comment: stable, patient is compliant with his CPAP therapy  Plan: continue current CPAP therapy for SEEMA treatment       Follow Up Plan:     Patient is instructed to return to Internal Medicine clinic for follow-up visit in 6 months.    Phone call duration: 30 minutes    Danyelle Larkin MD  Internal Medicine  [facility]

## 2021-03-03 ENCOUNTER — TELEPHONE (OUTPATIENT)
Dept: FAMILY MEDICINE | Facility: CLINIC | Age: 51
End: 2021-03-03

## 2021-03-03 NOTE — TELEPHONE ENCOUNTER
tadalafil (CIALIS) 5 MG tablet 30 tablet 11 3/1/2021  No   Sig: TAKE ONE TABLET BY MOUTH DAILY AS NEEDED FOR ED   Sent to pharmacy as: Tadalafil 5 MG Oral Tablet (CIALIS)   Class: E-Prescribe   Order: [id number]   E-Prescribing Status: Receipt confirmed by pharmacy (3/1/2021 10:26 AM CST)   Printout Tracking    External Result Report   Medication Administration Instructions    TAKE ONE TABLET BY MOUTH DAILY AS NEEDED FOR ED   Pharmacy    [id number] - [place], [place] - [address] [place] [address]

## 2021-03-03 NOTE — TELEPHONE ENCOUNTER
Pt called - was trying refill an old script on the tadalafil. Advised if calling in an old script number the pharmacy may not be able to fill, it gets a new script number every time a new script sent     Shingles and tetanus are due per pt. Scheduled a nurse only visit for this.     Chiqui WEST RN

## 2021-03-04 DIAGNOSIS — I48.0 PAROXYSMAL ATRIAL FIBRILLATION (H): ICD-10-CM

## 2021-03-04 RX ORDER — DILTIAZEM HYDROCHLORIDE 180 MG/1
180 CAPSULE, EXTENDED RELEASE ORAL DAILY
Qty: 90 CAPSULE | Refills: 0 | Status: SHIPPED | OUTPATIENT
Start: 2021-03-04 | End: 2021-06-11

## 2021-03-11 ENCOUNTER — ALLIED HEALTH/NURSE VISIT (OUTPATIENT)
Dept: NURSING | Facility: CLINIC | Age: 51
End: 2021-03-11
Payer: COMMERCIAL

## 2021-03-11 DIAGNOSIS — Z23 NEED FOR VACCINATION: Primary | ICD-10-CM

## 2021-03-11 PROCEDURE — 90715 TDAP VACCINE 7 YRS/> IM: CPT

## 2021-03-11 PROCEDURE — 99207 PR NO CHARGE NURSE ONLY: CPT

## 2021-03-11 PROCEDURE — 90471 IMMUNIZATION ADMIN: CPT

## 2021-03-11 PROCEDURE — 90472 IMMUNIZATION ADMIN EACH ADD: CPT

## 2021-03-11 PROCEDURE — 90750 HZV VACC RECOMBINANT IM: CPT

## 2021-03-11 NOTE — PROGRESS NOTES
Prior to immunization administration, verified patients identity using patient s name and date of birth. Please see Immunization Activity for additional information.     Screening Questionnaire for Adult Immunization    Are you sick today?   No   Do you have allergies to medications, food, a vaccine component or latex?   No   Have you ever had a serious reaction after receiving a vaccination?   No   Do you have a long-term health problem with heart, lung, kidney, or metabolic disease (e.g., diabetes), asthma, a blood disorder, no spleen, complement component deficiency, a cochlear implant, or a spinal fluid leak?  Are you on long-term aspirin therapy?   Yes   Do you have cancer, leukemia, HIV/AIDS, or any other immune system problem?   No   Do you have a parent, brother, or sister with an immune system problem?   No   In the past 3 months, have you taken medications that affect  your immune system, such as prednisone, other steroids, or anticancer drugs; drugs for the treatment of rheumatoid arthritis, Crohn s disease, or psoriasis; or have you had radiation treatments?   No   Have you had a seizure, or a brain or other nervous system problem?   No   During the past year, have you received a transfusion of blood or blood    products, or been given immune (gamma) globulin or antiviral drug?   No   For women: Are you pregnant or is there a chance you could become       pregnant during the next month?   No   Have you received any vaccinations in the past 4 weeks?   No     Immunization questionnaire was positive for at least one answer.  Notified Dr. Larkin.        Per orders of Dr. Larkin, injection of Shingrix and TDAP given by Shanon Mejia MA. Patient instructed to remain in clinic for 15 minutes afterwards, and to report any adverse reaction to me immediately.    ABN signed for Shingrix. Placed in STAT scanning.      Screening performed by Shanon Mejia MA on 3/11/2021 at 11:28 AM.

## 2021-03-15 ENCOUNTER — TELEPHONE (OUTPATIENT)
Dept: SLEEP MEDICINE | Facility: CLINIC | Age: 51
End: 2021-03-15

## 2021-03-15 DIAGNOSIS — G47.33 OSA (OBSTRUCTIVE SLEEP APNEA): Primary | ICD-10-CM

## 2021-03-15 NOTE — TELEPHONE ENCOUNTER
Patient called today.    Patient has a current appointment with  [facility] on [facility].    Would like to have mailed an order for new cpap supplies prior to this appointment.    Please contact patient.    Thank you.    Central Scheduling  Vanesa GUTIERREZ        Order placed for supplies.  Bennett Goltz, PA-C

## 2021-03-20 ENCOUNTER — HEALTH MAINTENANCE LETTER (OUTPATIENT)
Age: 51
End: 2021-03-20

## 2021-03-25 ENCOUNTER — MYC MEDICAL ADVICE (OUTPATIENT)
Dept: FAMILY MEDICINE | Facility: CLINIC | Age: 51
End: 2021-03-25

## 2021-03-26 NOTE — TELEPHONE ENCOUNTER
Luiza MORA,are you able to schedule an appt.for him?  Thank you,  Sylvie Wu RN on 3/26/2021 at 1:16 PM

## 2021-04-06 ENCOUNTER — MYC MEDICAL ADVICE (OUTPATIENT)
Dept: SLEEP MEDICINE | Facility: CLINIC | Age: 51
End: 2021-04-06

## 2021-04-07 ASSESSMENT — SLEEP AND FATIGUE QUESTIONNAIRES
HOW LIKELY ARE YOU TO NOD OFF OR FALL ASLEEP IN A CAR, WHILE STOPPED FOR A FEW MINUTES IN TRAFFIC: WOULD NEVER DOZE
HOW LIKELY ARE YOU TO NOD OFF OR FALL ASLEEP WHILE WATCHING TV: WOULD NEVER DOZE
HOW LIKELY ARE YOU TO NOD OFF OR FALL ASLEEP WHILE SITTING INACTIVE IN A PUBLIC PLACE: SLIGHT CHANCE OF DOZING
HOW LIKELY ARE YOU TO NOD OFF OR FALL ASLEEP WHILE SITTING AND TALKING TO SOMEONE: WOULD NEVER DOZE
HOW LIKELY ARE YOU TO NOD OFF OR FALL ASLEEP WHILE LYING DOWN TO REST IN THE AFTERNOON WHEN CIRCUMSTANCES PERMIT: WOULD NEVER DOZE
HOW LIKELY ARE YOU TO NOD OFF OR FALL ASLEEP WHEN YOU ARE A PASSENGER IN A CAR FOR AN HOUR WITHOUT A BREAK: SLIGHT CHANCE OF DOZING
HOW LIKELY ARE YOU TO NOD OFF OR FALL ASLEEP WHILE SITTING QUIETLY AFTER LUNCH WITHOUT ALCOHOL: WOULD NEVER DOZE
HOW LIKELY ARE YOU TO NOD OFF OR FALL ASLEEP WHILE SITTING AND READING: WOULD NEVER DOZE

## 2021-04-12 ENCOUNTER — VIRTUAL VISIT (OUTPATIENT)
Dept: SLEEP MEDICINE | Facility: CLINIC | Age: 51
End: 2021-04-12
Payer: COMMERCIAL

## 2021-04-12 VITALS — BODY MASS INDEX: 35 KG/M2 | WEIGHT: 250 LBS | HEIGHT: 71 IN

## 2021-04-12 DIAGNOSIS — G47.33 OSA (OBSTRUCTIVE SLEEP APNEA): Primary | ICD-10-CM

## 2021-04-12 PROCEDURE — 99214 OFFICE O/P EST MOD 30 MIN: CPT | Mod: TEL | Performed by: PHYSICIAN ASSISTANT

## 2021-04-12 ASSESSMENT — MIFFLIN-ST. JEOR: SCORE: 2001.12

## 2021-04-12 NOTE — PROGRESS NOTES
CPAP Follow-Up Visit:    Date on this visit: 4/12/2021    Jamison Gerber has a follow-up visit today to review his CPAP use for SEEMA. His medical history is significant for paroxysmal atrial fibrillation, hyperlipidemia.     He had a sleeps study at [facility] in 7/2004. His AHI was 34/hr, RDI 47/hr with an O2 sanford of 86%. CPAP 10 cm was effective.     HST 10/10/2018:   Analysis Time:  240.7 minutes  Wt: 257#   AHI 38.9/hr. AHI was 36.4 per hour supine, - per hour prone, 41.4 per hour on left side, and 39.6 per hour on right side.   Baseline oxygen saturation was 91%.  Time with saturation less than or equal to 88% was 31.5 minutes. The lowest oxygen saturation was 75%.   Position: Percent of time spent: supine - 15%, prone - 0%, on left - 39.7%, on right - 35.9%.     He feels CPAP is great. He says he is the poster-child for it. He says he is anal about cleaning it. He uses SoCeSparkan and cleans it regularly. He will be going to [place] and has a battery pack and solar .     PAP machine: [employer]. Pressure settings: 10-14 cm. He generally gets supplies online.    The compliance data shows that the patient used the CPAP for 29/30 nights, 96.7% of nights for >4 hours.  The 90th% pressure is 11.9 cm.  The average time in large leak is 6.5 min.  The average nightly usage is 8:38.  The average AHI is 1.8/hr.    Interface:  Mask: nasal pillows mask  Chin strap: No  Leak: No  Using Humidifier: Yes, sometimes the reservior is empty in the morning.  Condensation in hose or mask: No     Difficulties with therapy:    [-] Snoring with CPAP:  [-] Difficulty tolerating the pressure:  [-] Epistaxis/dry nose:   [-] Nasal congestion:  [-] Dry mouth:  [-] Mouth breathing:   [-] Pain/skin breakdown:      Improvements noted with CPAP:   [+] waking up more refreshed  [+] sleeping better with less arousals  [+] nocturia improved   [+] improved energy level during the day    Weight change since sleep study: 250# now. Down about 7 pounds  since 9/2018    Bedtime: 9 PM to midnight (11 PM on average).  Wake time: 7:30-8 AM. Falls asleep in 10-15 minutes. He sometime has a racing mind. Wakes 1-2 times per night for a variable amount of time. He notices waking more if he goes to sleep earlier. He sees a CBD tincture to help him sleep. They have a new puppy and he sometimes wakes for the puppy or to go to the restroom.  Naps: maybe a 1-2 times per month for up to an hour.       He has not worked in a year due to the pandemic.  He has quit smoking. He uses nicotine supplements.    Past medical/surgical history, family history, social history, medications and allergies were reviewed.      Problem List:  Patient Active Problem List    Diagnosis Date Noted     PAF (paroxysmal atrial fibrillation) (H)      Priority: Medium     Hyperlipidemia      Priority: Medium     Family history of heart disease      Priority: Medium     SEEMA (obstructive sleep apnea) 09/26/2018     Priority: Medium        Impression/Plan:    (G47.33) SEEMA (obstructive sleep apnea)  (primary encounter diagnosis)  Comment: Jamison is doing well with CPAP. He sleeps much better with CPAP and his use is very consistent. He sometimes has a racing mind during the night. He also sometimes has an excessive sleep opportunity.  Plan: Comprehensive DME        Continue auto CPAP 10-14 cm. A prescription was written for new supplies. We reviewed recommendations for cleaning and replacing supplies. We talked about SoClean use (consider avoiding using it in the motor part of the machine as there is some evidence ozone can degrade electrical wiring).  We talked about setting aside worry time in the early evening and listening to the radio/audio book or podcast to help distract his mind if needed at night. He was also advised to keep his time in bed limited to 8 hours.       He will follow up with me in about 2 year(s).     31 minutes were spent on the date of the encounter doing chart review, history and exam,  documentation and further activities as noted above.     Bennett Goltz, PA-C    CC: No ref. provider found

## 2021-04-12 NOTE — PROGRESS NOTES
"Jamison Gerber is a 51 year old male being evaluated via a billable telephone visit.     \"This telephone visit will be conducted via a call between you and your physician/provider. We have found that certain health care needs can be provided without the need for an in-person visit or physical exam.  This service lets us provide the care you need with a telephone conversation.  If a prescription is necessary we can send it directly to your pharmacy.  If lab work is needed we can place an order for that and you can then stop by our lab to have the test done at a later time.\"    Telephone visits are billed at different rates depending on your insurance coverage.  Please reach out to your insurance provider with any questions.    Patient has given verbal consent for  a Telephone visit? Yes    What telephone number would you like your provider to contact at at:    [phone]  How would you like to obtain your AVS? Allison Dixon MA    Telephone Visit Details:     Telephone Visit Start Time: 11:33 AM    Telephone Visit End Time:12:04 PM        "

## 2021-04-12 NOTE — PATIENT INSTRUCTIONS
General recommendations for sleep problems (Insomnia)  Allow 2-4 weeks to see results     Establish a regular sleep schedule    Most people only need 7-8 hours of sleep.  Don't be in bed longer than you need     to sleep or you will end up spending more time awake in bed. This trains your    brain to think of the bed as a place to not sleep.  Go to bed at same time each night   Get up at same time each day - Set an alarm everyday (even weekends). This is one of    the most important tips. It prevents you from relying on your insomnia to get you    up on time for your day. That actually reinforces insomnia. It also will help your    body get into a pattern where you start feeling tired at a consistent time each    night.  The body functions best when you keep a consistent routine.  Avoid sleeping-in and napping. Anytime you sleep during the day, you will be less tired at    night. You may be tired enough to fall asleep, but you will wake more in the    middle of the night because you will have met your sleep need before the night is    done.   Cut down time in bed (if not asleep, get up)- Use your bed only for sleep and sex    Anytime you spend time in bed doing activities other than sleep (reading,    watching TV, working, playing on the computer or phone, or even just laying in    bed trying to sleep), you are training  your brain to think of the bed as a place to    do activities other than sleep. If you are not falling asleep within 20-30 minutes,    get out of bed. While out of bed, avoid bright lights. Avoid work or chores. Being    productive in the middle of the night reinforces waking up at night. Find relaxing,    not particularly entertaining activities like reading, listening to music, or relaxation    exercises. Go back to bed if you start feeling groggy, or after about 30 minutes,    even if not feeling very tired. Sometimes, just getting out of bed stops the pattern    of getting frustrated about  laying in bed not sleeping, and that can help you fall    asleep.   Avoid trying to force yourself to sleep- sleep is not like everything else. The harder you    work at most things, the more you can accomplish. The harder you work at    sleep, the less you will sleep.     Make the bedroom comfortable - quiet, dark and cool are better. Consider ear plugs    (silicon). Use dark blinds or wear an eye mask if needed     Make a relaxing routine prior to bedtime  Relaxation exercises:   Progressive muscle relaxation: Relax each muscle group individually    Begin with your feet, flex, then relax. Try to imagine your feet feeling heavy and sinking into the bed. Move to your calves, do the same thing. Work through each muscle group toward your head.    Relaxing Mental Imagery: Try to imagine a trip that you took and found relaxing, or imagine a day at the beach. Try to walk yourself through the day in your mind as if you were dreaming it. Try to imagine sensing the different experiences, such as feeling sand between your toes, the heat of the sun on your skin, seeing the waves crashing the shore, the smell of the salt water, etc.     Deal with your worries before bedtime    Set aside a worry time around dinner time for 10-15 minutes. Write down the    things that are on your mind. Plan time in the coming days to address those    issues. Brainstorm ideas on how you will deal with them. Try to identify issues    that are out of your control, and try to let those issues go.  Listen to relaxation tapes   Classical Music or Nature sounds   Back Massage   Get regular exercise each day (at least 1-2 hours before bedtime)   Take medications only as directed   Eat a light bedtime snack or warm drink   Warm milk   Warm herbal tea (non-caffeinated)       Things to avoid   No overstimulating activities just before bed   No competitive games before bedtime   No exciting television programs before bedtime   Avoid caffeine after lunchtime    Avoid chocolate   Do not use alcohol to induce sleep (worsens Insomnia)   Do not take someone else's sleeping pills   Do not look at the clock when awakening   Do not turn on light when getting up to use bathroom, use a nightlight     Online Programs     www.SHUTi.me (pronounced shut eye). There is a fee for this program. Enter the code  [facility]  if you decide to enroll in this program.      www.sleepIO.com (pronounced sleep ee oh). There is a fee for this program. Enter the code  [facility]  if you decide to enroll in this program.     Suggested Resources  Insomnia Treatment Books     Overcoming Insomnia by Gen Mauricio and Jovita Recio (2008)    No More Sleepless Nights by Anirudh Delvalle and Audra Tabares (1996)    Say Manohar to Insomnia by Yvan Lynn (2009)    The Insomnia Workbook by Monse Patel and Michel Beckett (2009)    The Insomnia Answer by Alphonso Lara and Elias Lobo (2006)      Stress Management and Relaxation Books    The Relaxation and Stress Reduction Workbook by Nela Swain, Gayla Holland and Og Calloway (2008)    Stress Management Workbook: Techniques and Self-Assessment Procedures by Eliane Lucia and Willem Ellison (1997)    A Mindfulness-Based Stress Reduction Workbook by Bertram Renee and Dary Helton (2010)    The Complete Stress Management Workbook by Gibson Moe, Otoniel Zhu and Anthony Sun (1996)    Assert Yourself by Pearl Cornejo and Srinivas Cornejo (1977)    Relaxation Resources for Computer Download   These websites offer resources to help you relax. This list is for information only. [facility] is not responsible for the quality of services or the actions of any person or organization.  Progressive Muscle Relaxation (PMR):     http://www.innerJAZIOstudio.com/progressive-muscle-relaxation-exercise.html     http://studentsupport.[facility]/counseling/resources/self-help/relaxation-and-stress-management/   Deep Breathing  Exercises:    http://www.[employer]ia[employer]ala/breathing-awareness.html     Meditation:     www.[employer]    www.theRoll20guided-meditation-site.com You may have to pay for some of these resources.    Guided Imagery:    http://www.Viajala/guided-imagery-scripts.html     http://[employer]/library/sfohgmskni-jyzmad-uwuirkl/   Consider phone apps such as: Calm, Headspace or Insight Timer.    Counseling / Behavioral Health  [facility]  Visit www.[facility].org or call [phone] to find a clinic close to you.  Or call [phone] for [facility].    Your BMI is Body mass index is 34.87 kg/m .  Weight management is a personal decision.  If you are interested in exploring weight loss strategies, the following discussion covers the approaches that may be successful. Body mass index (BMI) is one way to tell whether you are at a healthy weight, overweight, or obese. It measures your weight in relation to your height.  A BMI of 18.5 to 24.9 is in the healthy range. A person with a BMI of 25 to 29.9 is considered overweight, and someone with a BMI of 30 or greater is considered obese. More than two-thirds of American adults are considered overweight or obese.  Being overweight or obese increases the risk for further weight gain. Excess weight may lead to heart disease and diabetes.  Creating and following plans for healthy eating and physical activity may help you improve your health.  Weight control is part of healthy lifestyle and includes exercise, emotional health, and healthy eating habits. Careful eating habits lifelong are the mainstay of weight control. Though there are significant health benefits from weight loss, long-term weight loss with diet alone may be very difficult to achieve- studies show long-term success with dietary management in less than 10% of people. Attaining a healthy weight may be especially difficult to achieve in those with  severe obesity. In some cases, medications, devices and surgical management might be considered.  What can you do?  If you are overweight or obese and are interested in methods for weight loss, you should discuss this with your provider.     Consider reducing daily calorie intake by 500 calories.     Keep a food journal.     Avoiding skipping meals, consider cutting portions instead.    Diet combined with exercise helps maintain muscle while optimizing fat loss. Strength training is particularly important for building and maintaining muscle mass. Exercise helps reduce stress, increase energy, and improves fitness. Increasing exercise without diet control, however, may not burn enough calories to loose weight.       Start walking three days a week 10-20 minutes at a time    Work towards walking thirty minutes five days a week     Eventually, increase the speed of your walking for 1-2 minutes at time    In addition, we recommend that you review healthy lifestyles and methods for weight loss available through the National Institutes of Health patient information sites:  http://win.niddk.nih.gov/publications/index.htm    And look into health and wellness programs that may be available through your health insurance provider, employer, local community center, or ronald club.    Weight management plan: Patient was referred to their PCP to discuss a diet and exercise plan.

## 2021-05-05 ENCOUNTER — OFFICE VISIT (OUTPATIENT)
Dept: CARDIOLOGY | Facility: CLINIC | Age: 51
End: 2021-05-05
Payer: COMMERCIAL

## 2021-05-05 VITALS
WEIGHT: 257 LBS | SYSTOLIC BLOOD PRESSURE: 119 MMHG | BODY MASS INDEX: 35.98 KG/M2 | HEIGHT: 71 IN | DIASTOLIC BLOOD PRESSURE: 86 MMHG | HEART RATE: 100 BPM

## 2021-05-05 DIAGNOSIS — E78.5 HYPERLIPIDEMIA LDL GOAL <130: ICD-10-CM

## 2021-05-05 DIAGNOSIS — I48.0 PAROXYSMAL ATRIAL FIBRILLATION (H): Primary | ICD-10-CM

## 2021-05-05 PROCEDURE — 99213 OFFICE O/P EST LOW 20 MIN: CPT | Performed by: INTERNAL MEDICINE

## 2021-05-05 PROCEDURE — 93000 ELECTROCARDIOGRAM COMPLETE: CPT | Performed by: INTERNAL MEDICINE

## 2021-05-05 ASSESSMENT — MIFFLIN-ST. JEOR: SCORE: 2032.87

## 2021-05-05 NOTE — LETTER
5/5/2021    Danyelle Larkin MD  [address] [facility] [address]  [facility] [place] [address]    RE: Jamison WHYTE Buzz       Dear Colleague,    I had the pleasure of seeing Jamison W Buzz in the [facility].    CARDIOLOGY CLINIC CONSULTATION    REASON FOR CONSULT: A    PRIMARY CARE PHYSICIAN:  Danyelle Larkin    HISTORY OF PRESENT ILLNESS:  This is a pleasant 51-year-old gentleman who was first seen in December 2018.  He was initially seen me for cardiovscular screening and had a calcium score of 0.  He does have mild mixed hyperlipidemia.  At that time he also complained of atypical palpitations and Holter monitor had shown new onset atrial fibrillation that has since been persistent.  His [id number] score was 0 given the absence of diabetes coronary disease or hypertension or stroke.  He has SEEMA and obesity. He is taking Diltiazem 180 mg daily for his AF. He had seen Dr. Donnelly who recommended conservative Rx for his AF given that he is asymptomatic.     Today he is here for routine follow up. Denies any new cardiac symptoms. HR is within acceptable limits. No angina or HF or EP related symptoms.     PAST MEDICAL HISTORY:  Past Medical History:   Diagnosis Date     Family history of heart disease      Hyperlipidemia      Other abnormal glucose      PAF (paroxysmal atrial fibrillation) (H)      Sleep apnea     uses CPap       MEDICATIONS:  Current Outpatient Medications   Medication     Cholecalciferol (VITAMIN D-3) 1000 units CAPS     diltiazem ER (DILT-XR) 180 MG 24 hr capsule     Multiple Vitamins-Minerals (MULTIVITAMIN ADULT PO)     Nicotine Polacrilex (NICORETTE MT)     Nutritional Supplements (IMMUNE ENHANCE) TABS     omega 3 1000 MG CAPS     Probiotic Product (PROBIOTIC DAILY PO)     PSYLLIUM HUSK PO     tadalafil (CIALIS) 5 MG tablet     No current facility-administered medications for this visit.        ALLERGIES:  No Known Allergies    SOCIAL HISTORY:  I have reviewed this  patient's social history and updated it with pertinent information if needed. Jamison Gerber  reports that he quit smoking about 9 years ago. His smoking use included cigarettes. He has a 10.00 pack-year smoking history. He has quit using smokeless tobacco.  His smokeless tobacco use included chew. He reports current alcohol use. He reports current drug use. Drug: Marijuana.    FAMILY HISTORY:  I have reviewed this patient's family history and updated it with pertinent information if needed.   Family History   Problem Relation Age of Onset     Cerebrovascular Disease Father          age 54     Myocardial Infarction Father          at 54       REVIEW OF SYSTEMS:  Skin:  Negative     Eyes:  Negative    ENT:  Negative    Respiratory:  Negative    Cardiovascular:  Negative    Gastroenterology: Negative    Genitourinary:  not assessed    Musculoskeletal:  Negative    Neurologic:  Negative    Psychiatric:  Negative    Heme/Lymph/Imm:  Negative    Endocrine:  Negative        PHYSICAL EXAM:      BP: 119/86 Pulse: 100            Vital Signs with Ranges  Pulse:  [100] 100  BP: (119)/(86) 119/86  257 lbs 0 oz    Constitutional: awake, alert, no distress  Respiratory: Clear  Cardiovascular: Normal, irregular, in AF, no MRG, no edema normal JVP  GI: nondistended  Neuropsychiatric: appropriate affact    DATA:  Labs: Pertinent cardiac labs reviewed    ASSESSMENT:  Persistent AF  SEEMA    RECOMMENDATIONS:  1. Jamison is doing well from a cardiac standpoint. He has no symptoms.   2. Continue rate control strategy for AF with diltiazem.  3. See us in 1 year with an echo and labs.   4. From overall cardiovascular health, follow up with PCP to ensure DM risk is looked into. His CAC is 0. He needs to loose weight and eat healthy or would remain at increasing risk of vascular disease in the future.    EVELYN Reyes, [facility]  Cardiology - [facility]  May 5, 2021      cc:   No referring provider defined for this encounter.

## 2021-05-05 NOTE — PROGRESS NOTES
CARDIOLOGY CLINIC CONSULTATION    REASON FOR CONSULT: A    PRIMARY CARE PHYSICIAN:  Danyelle Larkin    HISTORY OF PRESENT ILLNESS:  This is a pleasant 51-year-old gentleman who was first seen in December 2018.  He was initially seen me for cardiovscular screening and had a calcium score of 0.  He does have mild mixed hyperlipidemia.  At that time he also complained of atypical palpitations and Holter monitor had shown new onset atrial fibrillation that has since been persistent.  His [id number] score was 0 given the absence of diabetes coronary disease or hypertension or stroke.  He has SEEMA and obesity. He is taking Diltiazem 180 mg daily for his AF. He had seen Dr. Donnelly who recommended conservative Rx for his AF given that he is asymptomatic.     Today he is here for routine follow up. Denies any new cardiac symptoms. HR is within acceptable limits. No angina or HF or EP related symptoms.     PAST MEDICAL HISTORY:  Past Medical History:   Diagnosis Date     Family history of heart disease      Hyperlipidemia      Other abnormal glucose      PAF (paroxysmal atrial fibrillation) (H)      Sleep apnea     uses CPap       MEDICATIONS:  Current Outpatient Medications   Medication     Cholecalciferol (VITAMIN D-3) 1000 units CAPS     diltiazem ER (DILT-XR) 180 MG 24 hr capsule     Multiple Vitamins-Minerals (MULTIVITAMIN ADULT PO)     Nicotine Polacrilex (NICORETTE MT)     Nutritional Supplements (IMMUNE ENHANCE) TABS     omega 3 1000 MG CAPS     Probiotic Product (PROBIOTIC DAILY PO)     PSYLLIUM HUSK PO     tadalafil (CIALIS) 5 MG tablet     No current facility-administered medications for this visit.        ALLERGIES:  No Known Allergies    SOCIAL HISTORY:  I have reviewed this patient's social history and updated it with pertinent information if needed. Jamison Gerber  reports that he quit smoking about 9 years ago. His smoking use included cigarettes. He has a 10.00 pack-year smoking history. He has quit using  smokeless tobacco.  His smokeless tobacco use included chew. He reports current alcohol use. He reports current drug use. Drug: Marijuana.    FAMILY HISTORY:  I have reviewed this patient's family history and updated it with pertinent information if needed.   Family History   Problem Relation Age of Onset     Cerebrovascular Disease Father          age 54     Myocardial Infarction Father          at 54       REVIEW OF SYSTEMS:  Skin:  Negative     Eyes:  Negative    ENT:  Negative    Respiratory:  Negative    Cardiovascular:  Negative    Gastroenterology: Negative    Genitourinary:  not assessed    Musculoskeletal:  Negative    Neurologic:  Negative    Psychiatric:  Negative    Heme/Lymph/Imm:  Negative    Endocrine:  Negative        PHYSICAL EXAM:      BP: 119/86 Pulse: 100            Vital Signs with Ranges  Pulse:  [100] 100  BP: (119)/(86) 119/86  257 lbs 0 oz    Constitutional: awake, alert, no distress  Respiratory: Clear  Cardiovascular: Normal, irregular, in AF, no MRG, no edema normal JVP  GI: nondistended  Neuropsychiatric: appropriate affact    DATA:  Labs: Pertinent cardiac labs reviewed    ASSESSMENT:  Persistent AF  SEEMA    RECOMMENDATIONS:  1. Jamison is doing well from a cardiac standpoint. He has no symptoms.   2. Continue rate control strategy for AF with diltiazem.  3. See us in 1 year with an echo and labs.   4. From overall cardiovascular health, follow up with PCP to ensure DM risk is looked into. His CAC is 0. He needs to loose weight and eat healthy or would remain at increasing risk of vascular disease in the future.    EVELYN Reyes, [facility]  Cardiology - [facility]  May 5, 2021

## 2021-06-11 DIAGNOSIS — I48.0 PAROXYSMAL ATRIAL FIBRILLATION (H): ICD-10-CM

## 2021-06-11 RX ORDER — DILTIAZEM HYDROCHLORIDE 180 MG/1
180 CAPSULE, EXTENDED RELEASE ORAL DAILY
Qty: 90 CAPSULE | Refills: 2 | Status: SHIPPED | OUTPATIENT
Start: 2021-06-11 | End: 2022-03-01

## 2021-08-10 ENCOUNTER — ALLIED HEALTH/NURSE VISIT (OUTPATIENT)
Dept: NURSING | Facility: CLINIC | Age: 51
End: 2021-08-10
Payer: COMMERCIAL

## 2021-08-10 DIAGNOSIS — Z23 NEED FOR VACCINATION: Primary | ICD-10-CM

## 2021-08-10 PROCEDURE — 90750 HZV VACC RECOMBINANT IM: CPT

## 2021-08-10 PROCEDURE — 90471 IMMUNIZATION ADMIN: CPT

## 2021-08-10 PROCEDURE — 99207 PR NO CHARGE NURSE ONLY: CPT

## 2021-09-04 ENCOUNTER — HEALTH MAINTENANCE LETTER (OUTPATIENT)
Age: 51
End: 2021-09-04

## 2021-11-03 NOTE — LETTER
2018    Rosangela Abdi MD  [address]  [facility] [address]    RE: Jamison Gerber       Dear Colleague,    I had the pleasure of seeing Jamison Gerber in the [facility].    HPI and Plan:   See dictation [id number]    Orders Placed This Encounter   Procedures     CT Coronary Calcium Scan     Follow-Up with Cardiologist     EKG 12-lead complete w/read - Clinics (performed today)     Holter Monitor 48 hour Adult Pediatric     Echocardiogram Complete     No orders of the defined types were placed in this encounter.    There are no discontinued medications.      Encounter Diagnoses   Name Primary?     Family history of ischemic heart disease Yes     Palpitations        CURRENT MEDICATIONS:  Current Outpatient Medications   Medication Sig Dispense Refill     Cholecalciferol (VITAMIN D-3) 1000 units CAPS Take 5,000 Units by mouth daily        Multiple Vitamins-Minerals (MULTIVITAMIN ADULT PO)        omega 3 1000 MG CAPS Take by mouth daily        Probiotic Product (PROBIOTIC DAILY PO) Take by mouth daily        PSYLLIUM HUSK PO Take by mouth daily          ALLERGIES   No Known Allergies    PAST MEDICAL HISTORY:  Past Medical History:   Diagnosis Date     Family history of heart disease      Hyperlipidemia      Other abnormal glucose      Sleep apnea     uses CPap       PAST SURGICAL HISTORY:  Past Surgical History:   Procedure Laterality Date     EXCISE LESION EYELID  2012    Procedure:EXCISE LESION EYELID; LEFT UPPER LID LESION EXCISION WITH SILICONE INTUBATION; Surgeon:MANUEL JIMENEZ; Location:[facility]       FAMILY HISTORY:  Family History   Problem Relation Age of Onset     Cerebrovascular Disease Father          age 54     Myocardial Infarction Father          at 54       SOCIAL HISTORY:  Social History     Socioeconomic History     Marital status: Single     Spouse name: None     Number of children: None     Years of education: None     Highest education  "level: None   Social Needs     Financial resource strain: None     Food insecurity - worry: None     Food insecurity - inability: None     Transportation needs - medical: None     Transportation needs - non-medical: None   Occupational History     None   Tobacco Use     Smoking status: Former Smoker     Packs/day: 0.50     Years: 20.00     Pack years: 10.00     Types: Cigarettes     Last attempt to quit: 2012     Years since quittin.8     Smokeless tobacco: Current User     Types: Chew   Substance and Sexual Activity     Alcohol use: Yes     Comment: 3-4 days per week, 2-3 drinks     Drug use: Yes     Types: Marijuana     Comment: couple times per week     Sexual activity: Yes     Partners: Female   Other Topics Concern     Parent/sibling w/ CABG, MI or angioplasty before 65F 55M? Yes   Social History Narrative     None       Review of Systems:  Skin:  Negative     Eyes:  Positive for    ENT:  Negative    Respiratory:  Positive for sleep apnea;CPAP  Cardiovascular:  syncope or near-syncope;Negative;chest pain;edema;cyanosis;fatigue;exercise intolerance Positive for;palpitations;lightheadedness;dizziness  Gastroenterology: Negative    Genitourinary:  Positive for decreased urinary stream  Musculoskeletal:  Negative    Neurologic:  Negative    Psychiatric:  Negative    Heme/Lymph/Imm:  Negative    Endocrine:  Negative      Physical Exam:  Vitals: /83 (BP Location: Right arm, Cuff Size: Adult Large)   Pulse 76   Ht 1.803 m (5' 11\")   Wt 115.2 kg (253 lb 14.4 oz)   BMI 35.41 kg/m         Recent Lab Results:  LIPID RESULTS:  Lab Results   Component Value Date    CHOL 205 (H) 2018    HDL 44 2018     (H) 2018    TRIG 109 2018       LIVER ENZYME RESULTS:  No results found for: AST, ALT    CBC RESULTS:  Lab Results   Component Value Date    WBC 6.9 2018    RBC 5.10 2018    HGB 15.3 2018    HCT 46.2 2018    MCV 91 2018    MCH 30.0 2018    " MCHC 33.1 12/06/2018    RDW 13.8 12/06/2018     12/06/2018       BMP RESULTS:  Lab Results   Component Value Date     12/06/2018    POTASSIUM 4.3 12/06/2018    CHLORIDE 104 12/06/2018    CO2 25 12/06/2018    ANIONGAP 8 12/06/2018     (H) 12/06/2018    BUN 18 12/06/2018    CR 0.88 12/06/2018    GFRESTIMATED >90 12/06/2018    GFRESTBLACK >90 12/06/2018    BIANKA 9.6 12/06/2018        A1C RESULTS:  Lab Results   Component Value Date    A1C 5.6 12/06/2018       INR RESULTS:  No results found for: INR        CC  Danyelle Larkin MD  [address]  [place] [address]                    Thank you for allowing me to participate in the care of your patient.      Sincerely,     Yumiko Santos MD     [facility]    cc:   Danyelle Larkin MD  [address]  [place] [address]         01-Nov-2021 07:46

## 2021-12-13 ENCOUNTER — TELEPHONE (OUTPATIENT)
Dept: SLEEP MEDICINE | Facility: CLINIC | Age: 51
End: 2021-12-13

## 2021-12-13 NOTE — TELEPHONE ENCOUNTER
Reason for call:  Other   Patient called regarding (reason for call): call back  Additional comments: Pt requesting call back, received notification that his CPAP is under recall    Phone number to reach patient:  Home number on file [phone] (home)    Best Time:  any    Can we leave a detailed message on this number?  YES    Travel screening: Not Applicable    
What Type Of Note Output Would You Prefer (Optional)?: Bullet Format
Hpi Title: Evaluation of Skin Lesions
How Severe Are Your Spot(S)?: mild
Have Your Spot(S) Been Treated In The Past?: has not been treated

## 2021-12-14 ENCOUNTER — DOCUMENTATION ONLY (OUTPATIENT)
Dept: SLEEP MEDICINE | Facility: CLINIC | Age: 51
End: 2021-12-14
Payer: COMMERCIAL

## 2021-12-14 NOTE — PROGRESS NOTES
Patient call regarding [employer] recall for their pap device.    Device type:: Auto CPAP    Supplemental oxygen: No , if yes moved to advanced device workflow.     Current durable medical equipment provider: [facility]     Age of your current device:  less than 5 years old    History review:     Does the patient have the following?      COPD No     Hypoventilation No    Pulmonary hypertension No    Neuromuscular disease related respiratory problems No    History of past or present cardiac arrhythmia  No    History of heart failure  No    Recent hospitalization for breathing problems No       Other concerns:    DOT license requiring treatment of obstructive sleep apnea occupation that requires operation of hazardous equipment  No    Extreme sleepiness or drowsy driving prior to using CPAP or BiPAP treatment? Yes       Discontinuation of PAP therapy would lead to substantial deterioration of functional status or quality of life. Yes    If yes to any of the questions      Advised patient to continue using therapy until device is replaced or repaired.    Advised patient to avoid unapproved cleaning methods, such as ozone (see FDA safety communication on use of ozone ).    Has patient registered device? Yes Advised patient to register for repair or replacement on the [employer] website.  Patient can call [employer] at [phone] for additional support.    Bacterial filters to reduce exposure to particulates are sometimes cumbersome to use and are not currently recommended by the .If you choose to use a bacterial filter, consider discontinuing using humidity with the filter.     Does the patient feel they still need to have further discussion with provider ?   No     Please contact your DME to see if you are eligible to receive a new device if it is over 5 years old.  You may also choose to pay out of pocket for a new device, if your insurance does not cover a new device at this  time.      Patient has current complaints of none recommending patient visit with primary care provider regarding symptoms.  If primary care provider is not able to determine cause of complaint patient instructed to discontinue use of device.     If no to all questions:     Advised patient to discontinue use of the device.     Has patient registered device? Yes Advised patient to register for repair or replacement on the [employer] website.  Patient can call [employer] at [phone] for additional support.    Get another device that is not impacted by recall if possible. Please contact your DME to see if you are eligible to receive a new device if it is over 5 years old.     Discuss alternative treatments, including positional therapy, oral appliance therapy, and surgery.       Discussed behavioral strategies such as weight loss, exercise, and avoidance of alcohol and sedatives before bedtime.    Does the patient have additional questions or concerns to be sent to the provider at this time?      Plan :     Patient wishes to continue therapy. Recommended  to continue use of therapy until it is repaired or replaced.

## 2022-03-01 DIAGNOSIS — I48.0 PAROXYSMAL ATRIAL FIBRILLATION (H): ICD-10-CM

## 2022-03-01 RX ORDER — DILTIAZEM HYDROCHLORIDE 180 MG/1
180 CAPSULE, EXTENDED RELEASE ORAL DAILY
Qty: 90 CAPSULE | Refills: 0 | Status: SHIPPED | OUTPATIENT
Start: 2022-03-01 | End: 2022-05-31

## 2022-04-14 ENCOUNTER — TELEPHONE (OUTPATIENT)
Dept: CARDIOLOGY | Facility: CLINIC | Age: 52
End: 2022-04-14
Payer: COMMERCIAL

## 2022-04-14 NOTE — TELEPHONE ENCOUNTER
"Patient states \"I feel fine, I feel great. I've lost 20 pounds in the past year and doing great, but I have been waiting for the year 52 since that is the year my father . I am now 52 and very concerned with timely follow up.\" Pt goes on to say he's very stressed by late follow up appt offered by scheduling (Dr. Santos return visits booked until September) and d/t family history wants timely follow up. Pt states if we cannot get him in on time he will otherwise choose to go to a different healthcare organization. Did let pt know I understand the frustrations of scheduling access w/the provider schedules being booked out several months. Scheduled pt in [facility] (pt willing to go there) for a return visit on a NP slot . Scheduling called pt and setup Echo, labs to be done as well. Pt very appreciative, states no other concerns today. Yesika Pearson RN on 2022 at 2:50 PM    "

## 2022-04-14 NOTE — TELEPHONE ENCOUNTER
[facility] Call Center    Phone Message    May a detailed message be left on voicemail: yes     Reason for Call: Other: The patient has an order to follow up in May     Dr Santos has no availability through August and September schedule is not out yet. PT will need to have his orders extended for labs and Echo so he can schedule them a week before F/U, and he asked that Dr Santos review his records and see him or he may have to change providers, locations or even systems. Because He is scared about his heart care  Please send to in-clinic schedulers to schedule     Action Taken: Other: cardiology    Travel Screening: Not Applicable

## 2022-04-16 ENCOUNTER — HEALTH MAINTENANCE LETTER (OUTPATIENT)
Age: 52
End: 2022-04-16

## 2022-04-21 ENCOUNTER — HOSPITAL ENCOUNTER (OUTPATIENT)
Dept: CARDIOLOGY | Facility: CLINIC | Age: 52
Discharge: HOME | End: 2022-04-21
Attending: INTERNAL MEDICINE | Admitting: INTERNAL MEDICINE
Payer: COMMERCIAL

## 2022-04-21 DIAGNOSIS — I48.0 PAROXYSMAL ATRIAL FIBRILLATION (H): ICD-10-CM

## 2022-04-21 DIAGNOSIS — E78.5 HYPERLIPIDEMIA LDL GOAL <130: ICD-10-CM

## 2022-04-21 LAB — LVEF ECHO: NORMAL

## 2022-04-21 PROCEDURE — 999N000208 ECHOCARDIOGRAM COMPLETE

## 2022-04-21 PROCEDURE — 93306 TTE W/DOPPLER COMPLETE: CPT | Mod: 26 | Performed by: INTERNAL MEDICINE

## 2022-04-21 PROCEDURE — 255N000002 HC RX 255 OP 636: Performed by: INTERNAL MEDICINE

## 2022-04-21 RX ADMIN — HUMAN ALBUMIN MICROSPHERES AND PERFLUTREN 9 ML: 10; .22 INJECTION, SOLUTION INTRAVENOUS at 16:51

## 2022-04-29 ENCOUNTER — TELEPHONE (OUTPATIENT)
Dept: SLEEP MEDICINE | Facility: CLINIC | Age: 52
End: 2022-04-29
Payer: COMMERCIAL

## 2022-04-29 DIAGNOSIS — G47.33 OSA (OBSTRUCTIVE SLEEP APNEA): Primary | ICD-10-CM

## 2022-04-29 NOTE — TELEPHONE ENCOUNTER
Reason for Call:  Other order    Detailed comments: elle called and would like an order for a new cpap mask from [facility]  Please contact patient.  Thank you.    Phone Number Patient can be reached at: Home number on file [phone] (home)    Best Time: any    Can we leave a detailed message on this number? YES    Call taken on 4/29/2022 at 11:08 AM by Vanesa Hagen

## 2022-05-03 NOTE — PROGRESS NOTES
Sleep Consultation:    Date on this visit: 9/26/2018    Jamison Gerber is sent by No ref. provider found for a sleep consultation regarding SEEMA.    Primary Physician: Rosangela Abdi     Jamison Gerber presents for management of previously diagnosed SEEMA. His medical history is otherwise noncontributory.      He had a sleeps study at [facility] in 7/2004. His AHI was 34/hr, RDI 47/hr with an O2 sanford of 86%. CPAP 10 cm was effective.    His pressures are 10-12 cm. He uses CPAP regularly. He just got a new Dreamstation from [employer]. He does not snore with CPAP. He uses an AirFit P10 nasal pillows mask. He does not have mask leak. No dry nose or mouth. He does use the humidifier.    The compliance data shows that he has used the CPAP for 30/30 nights, 100% of nights for >4 hours.  The 90th% pressure is 11.5 cm.  The average time in large leak is 0.  The average nightly usage is 7.9 hr.  The average AHI is 0.9/hr.      Jamison goes to sleep at 11:00 PM to midnight during the week. He wakes up at 7:30 AM without an alarm. He falls asleep in 5 minutes.  Jamison denies difficulty falling asleep. He uses marijuana a couple times per week. He feels it helps him sleep.  He wakes up 1-2 times a night for 5 minutes before falling back to sleep.  Jamison wakes up to go to the bathroom. He sometimes has a hard time getting back to sleep if he wakes at 3 AM. He starts thinking about the next day. On weekends, Jamison goes to sleep at 1:00 AM.  He wakes up at 9:00 AM without an alarm. He falls asleep in 5 minutes.  Patient gets an average of 8 hours of sleep per night.     Patient does racing mind and does not use electronics in bed, watch TV in bed and read in bed.     Jamison does do shift work.  He works day/evening shifts.  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 He does a lot of things related to music and bands. He sometimes works until bar close then. He lives with his wife.    Jamison does have a regular bed partner.  RN called mom back regarding pt. Mom states patient needs to be picked up from school because of eye discharge. Mom states patient does not have a fever, no redness to his eyes, no swelling. Mom states he has had discharge from his eyes the last few days but he has hx of allergies and this is normal for him. Pt takes claritin daily. No allergy eye drops currently. Mom states patient needs to be seen to return to school. RN offered mom appt with Dr. Ornelas tomorrow morning but mom asking if any other provider has availability this afternoon. Informed mom of appt times. Mom verbalized understanding.    There is not report of gasping, snorting or witnessed apneas with CPAP. They never sleep separately.  Patient sleeps on his back and side. He denies morning headaches, morning confusion and restless legs. Jamison has frequent bruxism (wears a bite guard) and denies any sleep walking, sleep talking, dream enactment, sleep paralysis, cataplexy and hypnogogic/hypnopompic hallucinations.    Jamison denies difficulty breathing through his nose, claustrophobia, reflux at night, heartburn and depression.      Jamison has lost and regained 55 pounds in the last year.  Patient describes themself as a night person.  He would prefer to go to sleep at 10:00 PM and wake up at 7:00 AM.  Patient's Kyle Sleepiness score 5/24 inconsistent with daytime sleepiness.      Jamison does take naps for 10 minutes after lunch on most days. He takes no inadvertant naps.  He denies dozing while driving.  Patient was counseled on the importance of driving while alert, to pull over if drowsy, or nap before getting into the vehicle if sleepy.  He uses 1 cups/day of coffee. Last caffeine intake is usually before noon. He does have 1-2 servings of iced tea around lunch time.    Allergies:    No Known Allergies    Medications:    No current outpatient prescriptions on file.       Problem List:  There are no active problems to display for this patient.       Past Medical/Surgical History:  Past Medical History:   Diagnosis Date     Other abnormal glucose      Sleep apnea     uses CPap     Past Surgical History:   Procedure Laterality Date     EXCISE LESION EYELID  1/27/2012    Procedure:EXCISE LESION EYELID; LEFT UPPER LID LESION EXCISION WITH SILICONE INTUBATION; Surgeon:MANUEL JIMENEZ; Location:[facility]       Social History:  Social History     Social History     Marital status: Single     Spouse name: N/A     Number of children: N/A     Years of education: N/A     Occupational History     Not on file.     Social History Main Topics     Smoking status:  Former Smoker     Quit date: 2012     Smokeless tobacco: Never Used     Alcohol use Yes      Comment: 3-4 days per week, 2-3 drinks     Drug use: Yes     Special: Marijuana      Comment: couple times per week     Sexual activity: Not on file     Other Topics Concern     Not on file     Social History Narrative       Family History:  Family History   Problem Relation Age of Onset     Cerebrovascular Disease Father       age 54       Review of Systems:  A complete review of systems reviewed by me is negative with the exeption of what has been mentioned in the history of present illness.  CONSTITUTIONAL: NEGATIVE for fever, chills, sweats or night sweats, drug allergies.  CONSTITUTIONAL:  POSITIVE for  weight gain and weight loss  EYES: NEGATIVE for blind spots, double vision.  EYES:  POSITIVE for changes in vision- needs cheaters  ENT: NEGATIVE for ear pain, sore throat, sinus pain, post-nasal drip, runny nose, bloody nose  CARDIAC: NEGATIVE for fast heartbeats or fluttering in chest, chest pain or pressure, breathlessness when lying flat, swollen legs or swollen feet.  NEUROLOGIC: NEGATIVE headaches, weakness or numbness in the arms or legs.  DERMATOLOGIC: NEGATIVE for rashes, new moles or change in mole(s)  PULMONARY: NEGATIVE SOB at rest, SOB with activity, dry cough, productive cough, coughing up blood, wheezing or whistling when breathing.    GASTROINTESTINAL: NEGATIVE for nausea or vomitting, loose or watery stools, fat or grease in stools, constipation, abdominal pain, bowel movements black in color or blood noted.  GENITOURINARY: NEGATIVE for pain during urination, blood in urine, urinating more frequently than usual, irregular menstrual periods.  MUSCULOSKELETAL: NEGATIVE for muscle pain, bone or joint pain, swollen joints.  ENDOCRINE: NEGATIVE for increased thirst or urination, diabetes.  LYMPHATIC: NEGATIVE for swollen lymph nodes, lumps or bumps in the breasts or nipple discharge.    Physical  "Examination:  Vitals: /83  Pulse 81  Temp 98  F (36.7  C) (Oral)  Resp 16  Ht 1.803 m (5' 11\")  Wt 116.6 kg (257 lb)  SpO2 96%  BMI 35.84 kg/m2  BMI= Body mass index is 35.84 kg/(m^2).    Neck Cir (cm): 45 cm    [place] Total Score 9/26/2018   Total score - [place] 5       YEN Total Score: 1 (09/26/18 1057)    GENERAL APPEARANCE: healthy, alert, no distress and cooperative  EYES: Eyes grossly normal to inspection, PERRL, conjunctivae and sclerae normal and lids and lashes normal  HENT: nose and mouth without ulcers or lesions, oropharynx very small and crowded and tonsillar hypertrophy  NECK: no adenopathy, no asymmetry, masses, or scars, thyroid normal to palpation and trachea midline and normal to palpation  RESP: lungs clear to auscultation - no rales, rhonchi or wheezes  CV: regular rates and rhythm, normal S1 S2, no S3 or S4, no murmur, click or rub and no irregular beats  LYMPHATICS: no cervical adenopathy  MS: extremities normal- no gross deformities noted  NEURO: Normal strength and tone, mentation intact, speech normal and cranial nerves 2-12 intact  Mallampati Class: III.  Tonsillar Stage: 3  extending beyond pillars.    Impression/Plan:    (G47.33) SEEMA (obstructive sleep apnea)  (primary encounter diagnosis)  Comment: Severe SEEMA diagnosed in 2004 at [facility], AHI 34/hr, RDI 47/hr, O2 sanford 86%. It is unclear how much he weighed then. He is faithful with CPAP and benefits greatly from it. His pressures are 10-12 cm. He feels the pressure could be higher at times. Data from his CPAP show no significant leak and an AHI <1/hr. His 90th% pressure is 11.5 cm. His machine is new, purchased out of pocket at [employer]. He would like to switch over to [employer].  Plan: HST-Home Sleep Apnea Test        Home study to document SEEMA diagnosis. I will write a prescription for new supplies once the test is complete. I changed his pressure settings to 10-14 cm. I did not ask him to go without " CPAP leading up to the test because he was not looking forward to not using his CPAP on the night of the test. His apnea is severe enough that we should be able to make the diagnosis without taking the night off of CPAP prior to the test.      He will follow up with me in approximately two weeks after his sleep study has been competed to review the results and discuss plan of care.       Polysomnography reviewed.  Obstructive sleep apnea reviewed.  Complications of untreated sleep apnea were reviewed.  45 minutes was spent during this visit, over 50% in counseling and coordination of care.  Bennett Goltz, PA-C    CC: No ref. provider found

## 2022-05-12 ENCOUNTER — LAB (OUTPATIENT)
Dept: LAB | Facility: CLINIC | Age: 52
End: 2022-05-12
Payer: COMMERCIAL

## 2022-05-12 DIAGNOSIS — E78.5 HYPERLIPIDEMIA LDL GOAL <130: Primary | ICD-10-CM

## 2022-05-12 DIAGNOSIS — E78.5 HYPERLIPIDEMIA LDL GOAL <130: ICD-10-CM

## 2022-05-12 DIAGNOSIS — I48.0 PAROXYSMAL ATRIAL FIBRILLATION (H): ICD-10-CM

## 2022-05-12 LAB
ALT SERPL W P-5'-P-CCNC: 31 U/L (ref 0–70)
ANION GAP SERPL CALCULATED.3IONS-SCNC: 5 MMOL/L (ref 3–14)
BASOPHILS # BLD AUTO: 0 10E3/UL (ref 0–0.2)
BASOPHILS NFR BLD AUTO: 0 %
BUN SERPL-MCNC: 16 MG/DL (ref 7–30)
CALCIUM SERPL-MCNC: 9.3 MG/DL (ref 8.5–10.1)
CHLORIDE BLD-SCNC: 102 MMOL/L (ref 94–109)
CHOLEST SERPL-MCNC: 186 MG/DL
CO2 SERPL-SCNC: 28 MMOL/L (ref 20–32)
CREAT SERPL-MCNC: 0.87 MG/DL (ref 0.66–1.25)
EOSINOPHIL # BLD AUTO: 0.1 10E3/UL (ref 0–0.7)
EOSINOPHIL NFR BLD AUTO: 1 %
ERYTHROCYTE [DISTWIDTH] IN BLOOD BY AUTOMATED COUNT: 12.9 % (ref 10–15)
FASTING STATUS PATIENT QL REPORTED: ABNORMAL
GFR SERPL CREATININE-BSD FRML MDRD: >90 ML/MIN/1.73M2
GLUCOSE BLD-MCNC: 113 MG/DL (ref 70–99)
HCT VFR BLD AUTO: 47.3 % (ref 40–53)
HDLC SERPL-MCNC: 60 MG/DL
HGB BLD-MCNC: 16.1 G/DL (ref 13.3–17.7)
IMM GRANULOCYTES # BLD: 0 10E3/UL
IMM GRANULOCYTES NFR BLD: 0 %
LDLC SERPL CALC-MCNC: 109 MG/DL
LYMPHOCYTES # BLD AUTO: 2 10E3/UL (ref 0.8–5.3)
LYMPHOCYTES NFR BLD AUTO: 31 %
MCH RBC QN AUTO: 30.6 PG (ref 26.5–33)
MCHC RBC AUTO-ENTMCNC: 34 G/DL (ref 31.5–36.5)
MCV RBC AUTO: 90 FL (ref 78–100)
MONOCYTES # BLD AUTO: 0.8 10E3/UL (ref 0–1.3)
MONOCYTES NFR BLD AUTO: 12 %
NEUTROPHILS # BLD AUTO: 3.6 10E3/UL (ref 1.6–8.3)
NEUTROPHILS NFR BLD AUTO: 56 %
NONHDLC SERPL-MCNC: 126 MG/DL
NRBC # BLD AUTO: 0 10E3/UL
NRBC BLD AUTO-RTO: 0 /100
PLATELET # BLD AUTO: 185 10E3/UL (ref 150–450)
POTASSIUM BLD-SCNC: 3.6 MMOL/L (ref 3.4–5.3)
RBC # BLD AUTO: 5.26 10E6/UL (ref 4.4–5.9)
SODIUM SERPL-SCNC: 135 MMOL/L (ref 133–144)
TRIGL SERPL-MCNC: 87 MG/DL
WBC # BLD AUTO: 6.5 10E3/UL (ref 4–11)

## 2022-05-12 PROCEDURE — 80061 LIPID PANEL: CPT | Performed by: INTERNAL MEDICINE

## 2022-05-12 PROCEDURE — 36415 COLL VENOUS BLD VENIPUNCTURE: CPT | Performed by: INTERNAL MEDICINE

## 2022-05-12 PROCEDURE — 80048 BASIC METABOLIC PNL TOTAL CA: CPT | Performed by: INTERNAL MEDICINE

## 2022-05-12 PROCEDURE — 84460 ALANINE AMINO (ALT) (SGPT): CPT | Performed by: INTERNAL MEDICINE

## 2022-05-12 PROCEDURE — 85025 COMPLETE CBC W/AUTO DIFF WBC: CPT | Performed by: INTERNAL MEDICINE

## 2022-05-13 ENCOUNTER — OFFICE VISIT (OUTPATIENT)
Dept: CARDIOLOGY | Facility: CLINIC | Age: 52
End: 2022-05-13
Payer: COMMERCIAL

## 2022-05-13 VITALS
HEART RATE: 90 BPM | BODY MASS INDEX: 33.11 KG/M2 | DIASTOLIC BLOOD PRESSURE: 74 MMHG | WEIGHT: 236.5 LBS | HEIGHT: 71 IN | OXYGEN SATURATION: 99 % | SYSTOLIC BLOOD PRESSURE: 128 MMHG

## 2022-05-13 DIAGNOSIS — E78.5 HYPERLIPIDEMIA LDL GOAL <130: ICD-10-CM

## 2022-05-13 DIAGNOSIS — I48.0 PAROXYSMAL ATRIAL FIBRILLATION (H): ICD-10-CM

## 2022-05-13 PROCEDURE — 99214 OFFICE O/P EST MOD 30 MIN: CPT | Performed by: INTERNAL MEDICINE

## 2022-05-13 NOTE — PROGRESS NOTES
CARDIOLOGY CLINIC CONSULTATION    REASON FOR CONSULT: Permanent atrial fibrillation    PRIMARY CARE PHYSICIAN:  Danyelle Larkin    Today's clinic visit entailed:  Review of the result(s) of each unique test - Echo ECG labs  35 minutes spent on the date of the encounter doing chart review, review of test results, interpretation of tests, patient visit and documentation   Provider  Link to [facility] Help Grid     The level of medical decision making during this visit was of high complexity.      HISTORY OF PRESENT ILLNESS:  This a very pleasant 52-year-old gentleman who is seen in consultation at [facility].  He has been seeing me since 2018.  He came for screening because his father had a cardiac death at 54 years of age.  Patient had a calcium score back in 2019 which was 0.  He has obesity and mild hypertriglyceridemia.  No other medical problems.  Was diagnosed with atrial fibrillation around that time as well was referred to EP.  Rate control strategy was recommended.  He has sleep apnea which likely has triggered this.  He remains in persistent atrial fibrillation.  He is asymptomatic.  EF has been normal.    Today he is here for routine follow-up.  Denies any new cardiovascular symptoms.  He has been functionally quite active and he is quite excited to see that he is lost 20 pounds of weight.  His triglycerides have improved with better diet and healthy lifestyle.  No angina heart failure syncope presyncope    PAST MEDICAL HISTORY:  Past Medical History:   Diagnosis Date     Family history of heart disease      Hyperlipidemia      Other abnormal glucose      PAF (paroxysmal atrial fibrillation) (H)      Sleep apnea     uses CPap       MEDICATIONS:  Current Outpatient Medications   Medication     Cholecalciferol (VITAMIN D-3) 1000 units CAPS     diltiazem ER (DILT-XR) 180 MG 24 hr capsule     Multiple Vitamins-Minerals (MULTIVITAMIN ADULT PO)     omega 3 1000 MG CAPS     Probiotic Product (PROBIOTIC DAILY PO)      PSYLLIUM HUSK PO     Nicotine Polacrilex (NICORETTE MT)     Nutritional Supplements (IMMUNE ENHANCE) TABS     tadalafil (CIALIS) 5 MG tablet     No current facility-administered medications for this visit.       ALLERGIES:  No Known Allergies    SOCIAL HISTORY:  I have reviewed this patient's social history and updated it with pertinent information if needed. Jamison Gerber  reports that he quit smoking about 10 years ago. His smoking use included cigarettes. He has a 10.00 pack-year smoking history. He has quit using smokeless tobacco.  His smokeless tobacco use included chew. He reports current alcohol use. He reports current drug use. Drug: Marijuana.    FAMILY HISTORY:  I have reviewed this patient's family history and updated it with pertinent information if needed.   Family History   Problem Relation Age of Onset     Cerebrovascular Disease Father          age 54     Myocardial Infarction Father          at 54       REVIEW OF SYSTEMS:  Skin:  Negative     Eyes:  Negative    ENT:  Negative    Respiratory:  Negative    Cardiovascular:  Negative    Gastroenterology: Negative    Genitourinary:  Negative    Musculoskeletal:  Negative    Neurologic:  Negative    Psychiatric:  Negative    Heme/Lymph/Imm:  Negative    Endocrine:  Negative        PHYSICAL EXAM:      BP: 128/74 Pulse: 90     SpO2: 99 %      Vital Signs with Ranges  Pulse:  [90] 90  BP: (128)/(74) 128/74  SpO2:  [99 %] 99 %  236 lbs 8 oz    Constitutional: awake, alert, no distress  Respiratory: Clear to auscultation bilaterally  Cardiovascular: Irregular heart sounds no murmur rubs or gallops no edema JVP normal  GI: Mildly obese with BMI of 33  Neuropsychiatric: appropriate affact    DATA:  Labs: Pertinent cardiac labs reviewed    Echocardiogram: Normal LV function    EKG: Atrial fibrillation    ASSESSMENT:  Persistent atrial fibrillation  Mild hypertriglyceridemia  Family history of premature cardiovascular disease and cardiac  death    RECOMMENDATIONS:  1. Patient is doing well without any cardiac symptoms. He remains in persistent atrial fibrillation.  Asymptomatic.  Continue diltiazem 180 mg daily.  Heart rate is under good control.  2. He does not have a known history of hypertension.  Having said that his blood pressure has run borderline previously.  He takes diltiazem for A. fib which may be masking it to a certain degree.  3. At this time his [id number] of 0, or 1 if you consider hypertension which he does not have a formal diagnosis of.   4. I discussed rationale for anticoagulation and stroke versus bleeding risk.  At this time he would be at low risk of stroke given his CHADS2 Vasc score. However it is reasonable for him to take baby aspirin moving forward.  5. He will see us back next year.  He will repeat a calcium score next year.  6. Continue healthy diet weight loss exercise.  See us with any clinical changes.    EVELYN Reyes, [facility]  Cardiology - [facility] Heart  May 13, 2022

## 2022-05-13 NOTE — LETTER
5/13/2022    Danyelle Larkin MD  [address]  [facility] [address]    RE: Jamison WHYTE Buzz       Dear Colleague,     I had the pleasure of seeing Jamisondex Mercedesfrancy in the [facility].  CARDIOLOGY CLINIC CONSULTATION    REASON FOR CONSULT: Permanent atrial fibrillation    PRIMARY CARE PHYSICIAN:  Danyelle Larkin    Today's clinic visit entailed:  Review of the result(s) of each unique test - Echo ECG labs  35 minutes spent on the date of the encounter doing chart review, review of test results, interpretation of tests, patient visit and documentation   Provider  Link to [facility] Help Grid     The level of medical decision making during this visit was of high complexity.      HISTORY OF PRESENT ILLNESS:  This a very pleasant 52-year-old gentleman who is seen in consultation at [facility].  He has been seeing me since 2018.  He came for screening because his father had a cardiac death at 54 years of age.  Patient had a calcium score back in 2019 which was 0.  He has obesity and mild hypertriglyceridemia.  No other medical problems.  Was diagnosed with atrial fibrillation around that time as well was referred to EP.  Rate control strategy was recommended.  He has sleep apnea which likely has triggered this.  He remains in persistent atrial fibrillation.  He is asymptomatic.  EF has been normal.    Today he is here for routine follow-up.  Denies any new cardiovascular symptoms.  He has been functionally quite active and he is quite excited to see that he is lost 20 pounds of weight.  His triglycerides have improved with better diet and healthy lifestyle.  No angina heart failure syncope presyncope    PAST MEDICAL HISTORY:  Past Medical History:   Diagnosis Date     Family history of heart disease      Hyperlipidemia      Other abnormal glucose      PAF (paroxysmal atrial fibrillation) (H)      Sleep apnea     uses CPap       MEDICATIONS:  Current Outpatient Medications   Medication     Cholecalciferol  (VITAMIN D-3) 1000 units CAPS     diltiazem ER (DILT-XR) 180 MG 24 hr capsule     Multiple Vitamins-Minerals (MULTIVITAMIN ADULT PO)     omega 3 1000 MG CAPS     Probiotic Product (PROBIOTIC DAILY PO)     PSYLLIUM HUSK PO     Nicotine Polacrilex (NICORETTE MT)     Nutritional Supplements (IMMUNE ENHANCE) TABS     tadalafil (CIALIS) 5 MG tablet     No current facility-administered medications for this visit.       ALLERGIES:  No Known Allergies    SOCIAL HISTORY:  I have reviewed this patient's social history and updated it with pertinent information if needed. Jamison Gerber  reports that he quit smoking about 10 years ago. His smoking use included cigarettes. He has a 10.00 pack-year smoking history. He has quit using smokeless tobacco.  His smokeless tobacco use included chew. He reports current alcohol use. He reports current drug use. Drug: Marijuana.    FAMILY HISTORY:  I have reviewed this patient's family history and updated it with pertinent information if needed.   Family History   Problem Relation Age of Onset     Cerebrovascular Disease Father          age 54     Myocardial Infarction Father          at 54       REVIEW OF SYSTEMS:  Skin:  Negative     Eyes:  Negative    ENT:  Negative    Respiratory:  Negative    Cardiovascular:  Negative    Gastroenterology: Negative    Genitourinary:  Negative    Musculoskeletal:  Negative    Neurologic:  Negative    Psychiatric:  Negative    Heme/Lymph/Imm:  Negative    Endocrine:  Negative        PHYSICAL EXAM:      BP: 128/74 Pulse: 90     SpO2: 99 %      Vital Signs with Ranges  Pulse:  [90] 90  BP: (128)/(74) 128/74  SpO2:  [99 %] 99 %  236 lbs 8 oz    Constitutional: awake, alert, no distress  Respiratory: Clear to auscultation bilaterally  Cardiovascular: Irregular heart sounds no murmur rubs or gallops no edema JVP normal  GI: Mildly obese with BMI of 33  Neuropsychiatric: appropriate affact    DATA:  Labs: Pertinent cardiac labs  reviewed    Echocardiogram: Normal LV function    EKG: Atrial fibrillation    ASSESSMENT:  Persistent atrial fibrillation  Mild hypertriglyceridemia  Family history of premature cardiovascular disease and cardiac death    RECOMMENDATIONS:  1. Patient is doing well without any cardiac symptoms. He remains in persistent atrial fibrillation.  Asymptomatic.  Continue diltiazem 180 mg daily.  Heart rate is under good control.  2. He does not have a known history of hypertension.  Having said that his blood pressure has run borderline previously.  He takes diltiazem for A. fib which may be masking it to a certain degree.  3. At this time his TCC4FR3-LBBz of 0, or 1 if you consider hypertension which he does not have a formal diagnosis of.   4. I discussed rationale for anticoagulation and stroke versus bleeding risk.  At this time he would be at low risk of stroke given his CHADS2 Vasc score. However it is reasonable for him to take baby aspirin moving forward.  5. He will see us back next year.  He will repeat a calcium score next year.  6. Continue healthy diet weight loss exercise.  See us with any clinical changes.    EVELYN Reyes, [facility]  Cardiology - [facility]  May 13, 2022    Thank you for allowing me to participate in the care of your patient.      Sincerely,     Yumiko Santos MD     [facility]  cc:   Yumiko Santos MD  [address]  [place] [address]

## 2022-05-31 DIAGNOSIS — I48.0 PAROXYSMAL ATRIAL FIBRILLATION (H): ICD-10-CM

## 2022-05-31 RX ORDER — DILTIAZEM HYDROCHLORIDE 180 MG/1
180 CAPSULE, EXTENDED RELEASE ORAL DAILY
Qty: 90 CAPSULE | Refills: 3 | Status: SHIPPED | OUTPATIENT
Start: 2022-05-31 | End: 2023-05-18

## 2022-10-16 ENCOUNTER — HEALTH MAINTENANCE LETTER (OUTPATIENT)
Age: 52
End: 2022-10-16

## 2023-02-01 ASSESSMENT — ENCOUNTER SYMPTOMS
HEMATURIA: 0
EYE PAIN: 0
NAUSEA: 0
HEADACHES: 0
DIZZINESS: 0
PALPITATIONS: 0
NERVOUS/ANXIOUS: 0
ABDOMINAL PAIN: 0
CHILLS: 0
MYALGIAS: 0
HEARTBURN: 0
CONSTIPATION: 0
PARESTHESIAS: 0
ARTHRALGIAS: 0
DYSURIA: 0
JOINT SWELLING: 0
DIARRHEA: 0
FREQUENCY: 0
HEMATOCHEZIA: 0
FEVER: 0
SORE THROAT: 0
COUGH: 0
WEAKNESS: 0
SHORTNESS OF BREATH: 0

## 2023-02-01 NOTE — PROGRESS NOTES
SUBJECTIVE:   CC: Jamison is an 52 year old who presents for preventative health visit.   Patient has been advised of split billing requirements and indicates understanding: Yes  Healthy Habits:     Getting at least 3 servings of Calcium per day:  Yes    Bi-annual eye exam:  Yes    Dental care twice a year:  Yes    Sleep apnea or symptoms of sleep apnea:  Sleep apnea    Diet:  Low salt, Low fat/cholesterol and Carbohydrate counting    Frequency of exercise:  2-3 days/week    Duration of exercise:  15-30 minutes    Taking medications regularly:  Yes    PHQ-2 Total Score: 0    Additional concerns today:  Yes        Today's PHQ-2 Score:   PHQ-2 (  [employer]) 2023   Q1: Little interest or pleasure in doing things 0   Q2: Feeling down, depressed or hopeless 0   PHQ-2 Score 0   PHQ-2 Total Score (12-17 Years)- Positive if 3 or more points; Administer PHQ-A if positive -   Q1: Little interest or pleasure in doing things Not at all   Q2: Feeling down, depressed or hopeless Not at all   PHQ-2 Score 0       Have you ever done Advance Care Planning? (For example, a Health Directive, POLST, or a discussion with a medical provider or your loved ones about your wishes): Yes, patient states has an Advance Care Planning document and will bring a copy to the clinic.    Social History     Tobacco Use     Smoking status: Former     Packs/day: 0.50     Years: 20.00     Pack years: 10.00     Types: Cigarettes     Quit date: 2012     Years since quittin.0     Smokeless tobacco: Former     Types: Chew   Substance Use Topics     Alcohol use: Yes     Comment: 3-4 days per week, 2-3 drinks     If you drink alcohol do you typically have >3 drinks per day or >7 drinks per week? Yes      Alcohol Use 2023   Prescreen: >3 drinks/day or >7 drinks/week? Yes   Prescreen: >3 drinks/day or >7 drinks/week? -   AUDIT SCORE  6     AUDIT - Alcohol Use Disorders Identification Test - Reproduced from the World Health Organization Audit 2001  (Second Edition) 2/1/2023   1.  How often do you have a drink containing alcohol? 4 or more times a week   2.  How many drinks containing alcohol do you have on a typical day when you are drinking? 1 or 2   3.  How often do you have five or more drinks on one occasion? Monthly   4.  How often during the last year have you found that you were not able to stop drinking once you had started? Never   5.  How often during the last year have you failed to do what was normally expected of you because of drinking? Never   6.  How often during the last year have you needed a first drink in the morning to get yourself going after a heavy drinking session? Never   7.  How often during the last year have you had a feeling of guilt or remorse after drinking? Never   8.  How often during the last year have you been unable to remember what happened the night before because of your drinking? Never   9.  Have you or someone else been injured because of your drinking? No   10. Has a relative, friend, doctor or other health care worker been concerned about your drinking or suggested you cut down? No   TOTAL SCORE 6       Last PSA:   PSA   Date Value Ref Range Status   12/06/2018 0.62 0 - 4 ug/L Final     Comment:     Assay Method:  Chemiluminescence using Siemens Vista analyzer       Reviewed orders with patient. Reviewed health maintenance and updated orders accordingly - Yes  Labs reviewed in EPIC    Reviewed and updated as needed this visit by clinical staff   Tobacco  Allergies  Meds              Reviewed and updated as needed this visit by Provider                 Past Medical History:   Diagnosis Date     Family history of heart disease      Hyperlipidemia      Other abnormal glucose      PAF (paroxysmal atrial fibrillation) (H)      Sleep apnea     uses CPap        Review of Systems   Constitutional: Negative for chills and fever.   HENT: Negative for congestion, ear pain, hearing loss and sore throat.    Eyes: Negative for  "pain and visual disturbance.   Respiratory: Negative for cough and shortness of breath.    Cardiovascular: Negative for chest pain, palpitations and peripheral edema.   Gastrointestinal: Negative for abdominal pain, constipation, diarrhea, heartburn, hematochezia and nausea.   Genitourinary: Positive for impotence and urgency. Negative for dysuria, frequency, genital sores, hematuria and penile discharge.   Musculoskeletal: Negative for arthralgias, joint swelling and myalgias.   Skin: Negative for rash.   Neurological: Negative for dizziness, weakness, headaches and paresthesias.   Psychiatric/Behavioral: Negative for mood changes. The patient is not nervous/anxious.        OBJECTIVE:   /69 (BP Location: Right arm, Patient Position: Sitting, Cuff Size: Adult Large)   Pulse 75   Temp 98.5  F (36.9  C) (Oral)   Resp 20   Ht 1.784 m (5' 10.25\")   Wt 109.6 kg (241 lb 9.6 oz)   SpO2 98%   BMI 34.42 kg/m      Physical Exam  GENERAL: alert and no distress  EYES: Eyes grossly normal to inspection  NECK: no asymmetry  RESP: lungs clear to auscultation  CV: regular rate and rhythm  ABDOMEN: nondistended  MS: no gross musculoskeletal defects noted, no edema  SKIN: no suspicious lesions or rashes  NEURO: mentation intact and speech normal  PSYCH: affect normal/bright    Diagnostic Test Results:  Labs reviewed in Epic    ASSESSMENT/PLAN:   Jamison was seen today for physical and recheck medication.    Diagnoses and all orders for this visit:    Routine history and physical examination of adult    Erectile dysfunction, unspecified erectile dysfunction type  -     Discontinue: tadalafil (CIALIS) 5 MG tablet; TAKE ONE TABLET BY MOUTH DAILY AS NEEDED FOR EDTAKE ONE TABLET BY MOUTH DAILY AS NEEDED FOR ED Strength: 5 mg  -     tadalafil (CIALIS) 10 MG tablet; TAKE ONE TABLET BY MOUTH DAILY AS NEEDED FOR EDTAKE ONE TABLET BY MOUTH DAILY AS NEEDED FOR ED Strength: 5 mg    Screening for prostate cancer  -     PSA, screen; " "Future  -     PSA, screen    Encounter for medication refill    Other orders  -     REVIEW OF HEALTH MAINTENANCE PROTOCOL ORDERS        Patient has been advised of split billing requirements and indicates understanding: Yes      COUNSELING:   Reviewed preventive health counseling, as reflected in patient instructions  Special attention given to:        Regular exercise       Healthy diet/nutrition      BMI:   Estimated body mass index is 34.42 kg/m  as calculated from the following:    Height as of this encounter: 1.784 m (5' 10.25\").    Weight as of this encounter: 109.6 kg (241 lb 9.6 oz).   Weight management plan: Discussed healthy diet and exercise guidelines      He reports that he quit smoking about 11 years ago. His smoking use included cigarettes. He has a 10.00 pack-year smoking history. He has quit using smokeless tobacco.  His smokeless tobacco use included chew.        Danyelle Larkin MD  [facility]  "

## 2023-02-02 ENCOUNTER — OFFICE VISIT (OUTPATIENT)
Dept: FAMILY MEDICINE | Facility: CLINIC | Age: 53
End: 2023-02-02
Payer: COMMERCIAL

## 2023-02-02 VITALS
WEIGHT: 241.6 LBS | OXYGEN SATURATION: 98 % | TEMPERATURE: 98.5 F | SYSTOLIC BLOOD PRESSURE: 115 MMHG | BODY MASS INDEX: 34.59 KG/M2 | HEIGHT: 70 IN | RESPIRATION RATE: 20 BRPM | DIASTOLIC BLOOD PRESSURE: 69 MMHG | HEART RATE: 75 BPM

## 2023-02-02 DIAGNOSIS — Z00.00 ROUTINE HISTORY AND PHYSICAL EXAMINATION OF ADULT: Primary | ICD-10-CM

## 2023-02-02 DIAGNOSIS — N52.9 ERECTILE DYSFUNCTION, UNSPECIFIED ERECTILE DYSFUNCTION TYPE: ICD-10-CM

## 2023-02-02 DIAGNOSIS — Z12.5 SCREENING FOR PROSTATE CANCER: ICD-10-CM

## 2023-02-02 DIAGNOSIS — Z76.0 ENCOUNTER FOR MEDICATION REFILL: ICD-10-CM

## 2023-02-02 LAB — PSA SERPL-MCNC: 0.73 NG/ML (ref 0–3.5)

## 2023-02-02 PROCEDURE — 99396 PREV VISIT EST AGE 40-64: CPT | Performed by: INTERNAL MEDICINE

## 2023-02-02 PROCEDURE — G0103 PSA SCREENING: HCPCS | Performed by: INTERNAL MEDICINE

## 2023-02-02 PROCEDURE — 36415 COLL VENOUS BLD VENIPUNCTURE: CPT | Performed by: INTERNAL MEDICINE

## 2023-02-02 RX ORDER — TADALAFIL 5 MG/1
TABLET ORAL
Qty: 30 TABLET | Refills: 11 | Status: SHIPPED | OUTPATIENT
Start: 2023-02-02 | End: 2023-02-02

## 2023-02-02 RX ORDER — TADALAFIL 10 MG/1
TABLET ORAL
Qty: 90 TABLET | Refills: 3 | Status: SHIPPED | OUTPATIENT
Start: 2023-02-02

## 2023-02-02 ASSESSMENT — ENCOUNTER SYMPTOMS
FREQUENCY: 0
FEVER: 0
DIZZINESS: 0
ARTHRALGIAS: 0
NERVOUS/ANXIOUS: 0
NAUSEA: 0
PALPITATIONS: 0
SHORTNESS OF BREATH: 0
HEARTBURN: 0
DIARRHEA: 0
PARESTHESIAS: 0
CONSTIPATION: 0
EYE PAIN: 0
HEMATURIA: 0
CHILLS: 0
JOINT SWELLING: 0
DYSURIA: 0
WEAKNESS: 0
HEMATOCHEZIA: 0
COUGH: 0
SORE THROAT: 0
MYALGIAS: 0
HEADACHES: 0
ABDOMINAL PAIN: 0

## 2023-02-02 ASSESSMENT — PAIN SCALES - GENERAL: PAINLEVEL: NO PAIN (0)

## 2023-04-06 ENCOUNTER — LAB (OUTPATIENT)
Dept: LAB | Facility: CLINIC | Age: 53
End: 2023-04-06
Payer: COMMERCIAL

## 2023-04-06 DIAGNOSIS — E78.5 HYPERLIPIDEMIA LDL GOAL <130: ICD-10-CM

## 2023-04-06 DIAGNOSIS — I48.0 PAROXYSMAL ATRIAL FIBRILLATION (H): ICD-10-CM

## 2023-04-06 LAB
ALT SERPL W P-5'-P-CCNC: 27 U/L (ref 10–50)
CHOLEST SERPL-MCNC: 212 MG/DL
HDLC SERPL-MCNC: 74 MG/DL
LDLC SERPL CALC-MCNC: 125 MG/DL
NONHDLC SERPL-MCNC: 138 MG/DL
TRIGL SERPL-MCNC: 65 MG/DL

## 2023-04-06 PROCEDURE — 36415 COLL VENOUS BLD VENIPUNCTURE: CPT | Performed by: INTERNAL MEDICINE

## 2023-04-06 PROCEDURE — 80061 LIPID PANEL: CPT | Performed by: INTERNAL MEDICINE

## 2023-04-06 PROCEDURE — 84460 ALANINE AMINO (ALT) (SGPT): CPT | Performed by: INTERNAL MEDICINE

## 2023-04-21 ENCOUNTER — OFFICE VISIT (OUTPATIENT)
Dept: CARDIOLOGY | Facility: CLINIC | Age: 53
End: 2023-04-21
Payer: COMMERCIAL

## 2023-04-21 VITALS
HEART RATE: 80 BPM | DIASTOLIC BLOOD PRESSURE: 80 MMHG | BODY MASS INDEX: 34.3 KG/M2 | SYSTOLIC BLOOD PRESSURE: 115 MMHG | OXYGEN SATURATION: 98 % | WEIGHT: 245 LBS | HEIGHT: 71 IN

## 2023-04-21 DIAGNOSIS — E78.5 HYPERLIPIDEMIA LDL GOAL <130: ICD-10-CM

## 2023-04-21 DIAGNOSIS — I48.0 PAROXYSMAL ATRIAL FIBRILLATION (H): ICD-10-CM

## 2023-04-21 PROCEDURE — 99214 OFFICE O/P EST MOD 30 MIN: CPT | Performed by: INTERNAL MEDICINE

## 2023-04-21 RX ORDER — ASPIRIN 81 MG/1
81 TABLET, CHEWABLE ORAL DAILY
COMMUNITY

## 2023-04-21 NOTE — LETTER
4/21/2023    Danyelle Larkin MD  [address]  [place] [address]    RE: Jamison Gerber       Dear Colleague,     I had the pleasure of seeing Jamison Gerber in the [facility].  CARDIOLOGY CLINIC CONSULTATION    PRIMARY CARE PHYSICIAN:  Danyelle Larkin    Review of the result(s) of each unique test - Labs ECG Last CAC     The level of medical decision making during this visit was of moderate complexity.    HISTORY OF PRESENT ILLNESS:  This a very pleasant 53-year-old gentleman who is seen in consultation at [place].  He has been seeing me since 2018.      He initially came for screening because his father had a cardiac death at 54 years of age. Patient had a calcium score back in 2019 which was 0.      He has obesity and mild hypertriglyceridemia.    Was diagnosed with atrial fibrillation around that time as well was referred to EP.  Rate control strategy was recommended.  He has sleep apnea which likely has triggered this.  He remains in persistent atrial fibrillation.  He is asymptomatic.  EF has been normal in the past.     Today he is here for routine follow-up.  Denies any new cardiovascular symptoms.  He has been functionally quite active.  No angina heart failure syncope presyncope.    PAST MEDICAL HISTORY:  Past Medical History:   Diagnosis Date    Family history of heart disease     Hyperlipidemia     Other abnormal glucose     PAF (paroxysmal atrial fibrillation) (H)     Sleep apnea     uses CPap       MEDICATIONS:  Current Outpatient Medications   Medication    aspirin (ASA) 81 MG chewable tablet    Cholecalciferol (VITAMIN D-3) 1000 units CAPS    diltiazem ER (DILT-XR) 180 MG 24 hr capsule    Multiple Vitamins-Minerals (MULTIVITAMIN ADULT PO)    Nutritional Supplements (IMMUNE ENHANCE) TABS    omega 3 1000 MG CAPS    Probiotic Product (PROBIOTIC DAILY PO)    PSYLLIUM HUSK PO    tadalafil (CIALIS) 10 MG tablet     No current facility-administered medications for this visit.        ALLERGIES:  No Known Allergies    SOCIAL HISTORY:  I have reviewed this patient's social history and updated it with pertinent information if needed. Jamison Gerber  reports that he quit smoking about 11 years ago. His smoking use included cigarettes. He has a 10.00 pack-year smoking history. He has quit using smokeless tobacco.  His smokeless tobacco use included chew. He reports current alcohol use. He reports current drug use. Drug: Marijuana.    FAMILY HISTORY:  I have reviewed this patient's family history and updated it with pertinent information if needed.   Family History   Problem Relation Age of Onset    Cerebrovascular Disease Father          age 54    Myocardial Infarction Father          at 54       REVIEW OF SYSTEMS:  Skin:  not assessed     Eyes:  not assessed    ENT:  not assessed    Respiratory:  Positive for sleep apnea;CPAP  Cardiovascular:  Negative for;palpitations;edema;fatigue;lightheadedness;dizziness chest pain;Positive for  Gastroenterology: not assessed    Genitourinary:  not assessed    Musculoskeletal:  not assessed    Neurologic:  not assessed    Psychiatric:  not assessed    Heme/Lymph/Imm:  not assessed    Endocrine:  not assessed        PHYSICAL EXAM:      BP: 115/80 Pulse: 80     SpO2: 98 %      Vital Signs with Ranges  Pulse:  [80] 80  BP: (115)/(80) 115/80  SpO2:  [98 %] 98 %  245 lbs 0 oz    Constitutional: alert, no distress  Respiratory: Good bilateral air entry  Cardiovascular: Irregular, no MRG  GI: nondistended  Neuropsychiatric: appropriate affact    ASSESSMENT: Pertinent issues addressed/ reviewed during this cardiology visit  Persistent atrial fibrillation    RECOMMENDATIONS:  The patient is asymptomatic from a cardiovascular standpoint.  Remains in persistent atrial fibrillation.  Per prior discussion with EP team, consider rate control strategy has been recommended.  His [id number] score is 0 unless we considered borderline hypertension then it would be  1.  In any case he is on aspirin at this time.  He is on diltiazem for rate control.  Discussed rationale behind stroke versus bleeding risk and anticoagulation approaches.  Patient does not have diabetes heart failure coronary disease or prior strokes.  Weight loss healthy diet recommended.    Follow-up next in the cardiology clinic with an echocardiogram and a calcium score.  See me sooner if anything changes clinically.  At this time he is asymptomatic.    It was a pleasure seeing this patient in clinic today. Please do not hesitate to contact me with any future questions.     EVELYN Reyes, [facility]  Cardiology - [facility]  April 21, 2023    This note was completed in part using dictation via the Dragon voice recognition software. Some word and grammatical errors may occur and must be interpreted in the appropriate clinical context.  If there are any questions pertaining to this issue, please contact me for further clarification.      Thank you for allowing me to participate in the care of your patient.      Sincerely,     Yumiko Santos MD     [facility]  cc:   Yumiko Santos MD  [address] [facility] [address] [place] [address]  [place] [address]

## 2023-04-21 NOTE — PROGRESS NOTES
CARDIOLOGY CLINIC CONSULTATION    PRIMARY CARE PHYSICIAN:  Danyelle Larkin    Review of the result(s) of each unique test - Labs ECG Last CAC     The level of medical decision making during this visit was of moderate complexity.    HISTORY OF PRESENT ILLNESS:  This a very pleasant 53-year-old gentleman who is seen in consultation at [place].  He has been seeing me since 2018.      He initially came for screening because his father had a cardiac death at 54 years of age. Patient had a calcium score back in 2019 which was 0.      He has obesity and mild hypertriglyceridemia.    Was diagnosed with atrial fibrillation around that time as well was referred to EP.  Rate control strategy was recommended.  He has sleep apnea which likely has triggered this.  He remains in persistent atrial fibrillation.  He is asymptomatic.  EF has been normal in the past.     Today he is here for routine follow-up.  Denies any new cardiovascular symptoms.  He has been functionally quite active.  No angina heart failure syncope presyncope.    PAST MEDICAL HISTORY:  Past Medical History:   Diagnosis Date     Family history of heart disease      Hyperlipidemia      Other abnormal glucose      PAF (paroxysmal atrial fibrillation) (H)      Sleep apnea     uses CPap       MEDICATIONS:  Current Outpatient Medications   Medication     aspirin (ASA) 81 MG chewable tablet     Cholecalciferol (VITAMIN D-3) 1000 units CAPS     diltiazem ER (DILT-XR) 180 MG 24 hr capsule     Multiple Vitamins-Minerals (MULTIVITAMIN ADULT PO)     Nutritional Supplements (IMMUNE ENHANCE) TABS     omega 3 1000 MG CAPS     Probiotic Product (PROBIOTIC DAILY PO)     PSYLLIUM HUSK PO     tadalafil (CIALIS) 10 MG tablet     No current facility-administered medications for this visit.       ALLERGIES:  No Known Allergies    SOCIAL HISTORY:  I have reviewed this patient's social history and updated it with pertinent information if needed. Jamison Gerber  reports that he quit  smoking about 11 years ago. His smoking use included cigarettes. He has a 10.00 pack-year smoking history. He has quit using smokeless tobacco.  His smokeless tobacco use included chew. He reports current alcohol use. He reports current drug use. Drug: Marijuana.    FAMILY HISTORY:  I have reviewed this patient's family history and updated it with pertinent information if needed.   Family History   Problem Relation Age of Onset     Cerebrovascular Disease Father          age 54     Myocardial Infarction Father          at 54       REVIEW OF SYSTEMS:  Skin:  not assessed     Eyes:  not assessed    ENT:  not assessed    Respiratory:  Positive for sleep apnea;CPAP  Cardiovascular:  Negative for;palpitations;edema;fatigue;lightheadedness;dizziness chest pain;Positive for  Gastroenterology: not assessed    Genitourinary:  not assessed    Musculoskeletal:  not assessed    Neurologic:  not assessed    Psychiatric:  not assessed    Heme/Lymph/Imm:  not assessed    Endocrine:  not assessed        PHYSICAL EXAM:      BP: 115/80 Pulse: 80     SpO2: 98 %      Vital Signs with Ranges  Pulse:  [80] 80  BP: (115)/(80) 115/80  SpO2:  [98 %] 98 %  245 lbs 0 oz    Constitutional: alert, no distress  Respiratory: Good bilateral air entry  Cardiovascular: Irregular, no MRG  GI: nondistended  Neuropsychiatric: appropriate affact    ASSESSMENT: Pertinent issues addressed/ reviewed during this cardiology visit  Persistent atrial fibrillation    RECOMMENDATIONS:  1. The patient is asymptomatic from a cardiovascular standpoint.  2. Remains in persistent atrial fibrillation.  Per prior discussion with EP team, consider rate control strategy has been recommended.  3. His [id number]-PDFd score is 0 unless we considered borderline hypertension then it would be 1.  In any case he is on aspirin at this time.  He is on diltiazem for rate control.  4. Discussed rationale behind stroke versus bleeding risk and anticoagulation approaches.   Patient does not have diabetes heart failure coronary disease or prior strokes.  5. Weight loss healthy diet recommended.    Follow-up next in the cardiology clinic with an echocardiogram and a calcium score.  See me sooner if anything changes clinically.  At this time he is asymptomatic.    It was a pleasure seeing this patient in clinic today. Please do not hesitate to contact me with any future questions.     EVELYN Reyes, [facility]  Cardiology - [facility]  April 21, 2023    This note was completed in part using dictation via the Dragon voice recognition software. Some word and grammatical errors may occur and must be interpreted in the appropriate clinical context.  If there are any questions pertaining to this issue, please contact me for further clarification.

## 2023-05-18 DIAGNOSIS — I48.0 PAROXYSMAL ATRIAL FIBRILLATION (H): ICD-10-CM

## 2023-05-18 RX ORDER — DILTIAZEM HYDROCHLORIDE 180 MG/1
180 CAPSULE, EXTENDED RELEASE ORAL DAILY
Qty: 90 CAPSULE | Refills: 3 | Status: SHIPPED | OUTPATIENT
Start: 2023-05-18 | End: 2024-05-13

## 2023-07-20 ENCOUNTER — TELEPHONE (OUTPATIENT)
Dept: SLEEP MEDICINE | Facility: CLINIC | Age: 53
End: 2023-07-20
Payer: COMMERCIAL

## 2023-07-20 DIAGNOSIS — G47.33 OSA (OBSTRUCTIVE SLEEP APNEA): Primary | ICD-10-CM

## 2023-07-20 NOTE — TELEPHONE ENCOUNTER
Reason for Call:  Other call back    Detailed comments: patient is calling as his facemask broke last night. He is scheduled for rx renewal in September and wondering what he can do. Please advise    Phone Number Patient can be reached at: Cell number on file:    Telephone Information:   Mobile [phone]       Best Time: Any     Can we leave a detailed message on this number? YES    Call taken on 7/20/2023 at 9:18 AM by Yesi Berry

## 2023-07-21 ENCOUNTER — TELEPHONE (OUTPATIENT)
Dept: SLEEP MEDICINE | Facility: CLINIC | Age: 53
End: 2023-07-21
Payer: COMMERCIAL

## 2023-07-21 NOTE — TELEPHONE ENCOUNTER
Returned pt call and let him know that his provider did sign the orders for his supplies so he can get a mask. I gave the [phone] phone number to him to call [facility] to get that replacement mask.

## 2023-07-21 NOTE — TELEPHONE ENCOUNTER
Last ov 4/12/21  Next ov 9/1/23    Patient requesting updated order for CPAP supplies prior to appointment. Order pended and routed to provider for consideration.    Ana JETER RN  [facility]

## 2023-07-21 NOTE — TELEPHONE ENCOUNTER
Reason for Call:  Other call back    Detailed comments: patient is calling again. His mask is broken and Needs a new one ASAP.     Phone Number Patient can be reached at: Cell number on file:    Telephone Information:   Mobile [phone]       Best Time: Any     Can we leave a detailed message on this number? YES    Call taken on 7/21/2023 at 9:39 AM by Yesi Berry

## 2023-08-31 NOTE — PROGRESS NOTES
Virtual Visit Details    Type of service:  Video Visit   Start Time: 2:00 PM   End Time: 2:31 PM    Originating Location (pt. Location): Home    Distant Location (provider location):  Off-site  Platform used for Video Visit: [facility]    CPAP Follow-Up Visit:    Date on this visit: 9/1/2023    Jamison Gerber has a follow-up visit today to review his CPAP use for SEEMA. His medical history is significant for paroxysmal atrial fibrillation, hyperlipidemia.      He had a sleeps study at [facility] in 7/2004. His AHI was 34/hr, RDI 47/hr with an O2 sanford of 86%. CPAP 10 cm was effective.      HST 10/10/2018:   Analysis Time:  240.7 minutes  Wt: 257#   AHI 38.9/hr. AHI was 36.4/hr supine, - /hr prone, 41.4/hr on left, and 39.6/hr on right.   Baseline SpO2 91%.  31.5 minutes spent below 89% (much of which appears to be artifact). The lowest oxygen saturation was 75%.   Position: Percent of time spent: supine - 15%, prone - 0%, on left - 39.7%, on right - 35.9%.      He has a Dreamstation 2 and feels it is way worse. He liked the Dreamstation 1.   He uses SoClean and cleans it regularly.      PAP machine: RespirBuzzwire Dreamstation 2. Pressure settings: 10-14 cm. He generally gets supplies online.    The compliance data shows that the patient used the CPAP for 30/30 nights, 100% of nights for >4 hours.  The 90th% pressure is 11.5 cm.  The average time in large leak is 46 sec.  The average nightly usage is 7:28.  The average AHI is 1.5/hr.         Interface:  Mask: nasal pillows p10 knock-off from [employer] mask. Cleans everything every 2-3 weeks.  Chin strap: No  Leak: Yes  Using Humidifier: Yes  Condensation in hose or mask: Yes/No     Difficulties with therapy:    [-] Snoring with CPAP:  [-] Difficulty tolerating the pressure:  [-] Epistaxis/dry nose:   [-] Nasal congestion:  [-] Dry mouth:  [-] Mouth breathing:   [-] Pain/skin breakdown:      Improvements noted with CPAP:  can't sleep without it  [+] waking up more refreshed  [+] sleeping  better with less arousals  [+] nocturia improved   [+] improved energy level during the day    Weight change since sleep study: 250 lbs-stable over the last 2 years    Bedtime: 11 PM to midnight.  Wake time: 7:30 AM. Falls asleep in 10 minutes.     Past medical/surgical history, family history, social history, medications and allergies were reviewed.      Problem List:  Patient Active Problem List    Diagnosis Date Noted    PAF (paroxysmal atrial fibrillation) (H)      Priority: Medium    Hyperlipidemia      Priority: Medium    Family history of heart disease      Priority: Medium    SEEMA (obstructive sleep apnea) 09/26/2018     Priority: Medium        Impression/Plan:    (G47.33) SEEMA (obstructive sleep apnea)  (primary encounter diagnosis)  Comment: Jamison uses CPAP regularly and can't sleep without it. He does not like the Dreamstation 2 machine and would like a different machine. He has been getting supplies OTC and does not like the knock-off mask either. His apnea is well controlled though.    Plan: Comprehensive DME        Order placed for a replacement CPAP 10-14 cm and new supplies. We reviewed how to adjust the ramp and humidity settings on his machine. We also talked about travel machines. He will hold off on that for now.     He will follow up with me in about 2 year(s).     34 minutes were spent on the date of the encounter doing chart review, history and exam, documentation and further activities as noted above.     Bennett Goltz, PA-C    CC: No ref. provider found

## 2023-09-01 ENCOUNTER — VIRTUAL VISIT (OUTPATIENT)
Dept: SLEEP MEDICINE | Facility: CLINIC | Age: 53
End: 2023-09-01
Payer: COMMERCIAL

## 2023-09-01 ENCOUNTER — MYC MEDICAL ADVICE (OUTPATIENT)
Dept: SLEEP MEDICINE | Facility: CLINIC | Age: 53
End: 2023-09-01

## 2023-09-01 VITALS — BODY MASS INDEX: 35 KG/M2 | WEIGHT: 250 LBS | HEIGHT: 71 IN

## 2023-09-01 DIAGNOSIS — G47.33 OSA (OBSTRUCTIVE SLEEP APNEA): Primary | ICD-10-CM

## 2023-09-01 PROCEDURE — 99214 OFFICE O/P EST MOD 30 MIN: CPT | Mod: VID | Performed by: PHYSICIAN ASSISTANT

## 2023-09-01 ASSESSMENT — SLEEP AND FATIGUE QUESTIONNAIRES
HOW LIKELY ARE YOU TO NOD OFF OR FALL ASLEEP WHILE SITTING INACTIVE IN A PUBLIC PLACE: WOULD NEVER DOZE
HOW LIKELY ARE YOU TO NOD OFF OR FALL ASLEEP WHILE LYING DOWN TO REST IN THE AFTERNOON WHEN CIRCUMSTANCES PERMIT: SLIGHT CHANCE OF DOZING
HOW LIKELY ARE YOU TO NOD OFF OR FALL ASLEEP WHILE SITTING QUIETLY AFTER LUNCH WITHOUT ALCOHOL: WOULD NEVER DOZE
HOW LIKELY ARE YOU TO NOD OFF OR FALL ASLEEP WHILE WATCHING TV: SLIGHT CHANCE OF DOZING
HOW LIKELY ARE YOU TO NOD OFF OR FALL ASLEEP WHEN YOU ARE A PASSENGER IN A CAR FOR AN HOUR WITHOUT A BREAK: SLIGHT CHANCE OF DOZING
HOW LIKELY ARE YOU TO NOD OFF OR FALL ASLEEP IN A CAR, WHILE STOPPED FOR A FEW MINUTES IN TRAFFIC: WOULD NEVER DOZE
HOW LIKELY ARE YOU TO NOD OFF OR FALL ASLEEP WHILE SITTING AND READING: SLIGHT CHANCE OF DOZING
HOW LIKELY ARE YOU TO NOD OFF OR FALL ASLEEP WHILE SITTING AND TALKING TO SOMEONE: WOULD NEVER DOZE

## 2023-09-01 ASSESSMENT — PAIN SCALES - GENERAL: PAINLEVEL: NO PAIN (0)

## 2023-09-01 NOTE — NURSING NOTE
Is the patient currently in the state of [place]? YES    Visit mode:VIDEO    If the visit is dropped, the patient can be reconnected by: VIDEO VISIT: Send to e-mail at: [email or web address]    Will anyone else be joining the visit? NO    How would you like to obtain your AVS? MyChart    Are changes needed to the allergy or medication list? Pt stated no changes to allergies and Pt stated no med changes    Reason for visit: RECHECK    Has patient had flu shot for current/most recent flu season? If so, when? Yes: 11/2022    Renetta Pacheco LPN

## 2023-09-26 ENCOUNTER — DOCUMENTATION ONLY (OUTPATIENT)
Dept: SLEEP MEDICINE | Facility: CLINIC | Age: 53
End: 2023-09-26
Payer: COMMERCIAL

## 2023-09-26 DIAGNOSIS — G47.33 OBSTRUCTIVE SLEEP APNEA (ADULT) (PEDIATRIC): Primary | ICD-10-CM

## 2023-09-26 NOTE — PROGRESS NOTES
Patient was offered choice of vendor and chose [facility].  Patient Jamison Gerber was set up at [facility]  on September 26, 2023. Patient received a Resmed Airsense 10 Pressures were set at  10-14 cm H2O.   Patient s ramp is off and FLEX/EPR is EPR, 2.  Patient received a Resmed Mask name: P10  Pillow mask size Large, heated tubing and heated humidifier.  Patient has the following compliance requirements: none. Sam O Humes

## 2023-11-27 ENCOUNTER — NURSE TRIAGE (OUTPATIENT)
Dept: FAMILY MEDICINE | Facility: CLINIC | Age: 53
End: 2023-11-27
Payer: COMMERCIAL

## 2023-11-27 NOTE — TELEPHONE ENCOUNTER
November 26, 2023  Jamison Gerber   to [facility] Triage Im (supporting Danyelle Larkin MD)   OY      11/26/23 11:22 AM  My wife and I have covid since tuesday of last week. We tested positive on a home test. We are feeling better but we think we're still contagious. Do you suggest that we go into a pharmacy and get Lab test?

## 2023-11-28 NOTE — TELEPHONE ENCOUNTER
[email or web address]    Chiqui WEST, Triage RN  [facility]

## 2024-01-04 ENCOUNTER — PATIENT OUTREACH (OUTPATIENT)
Dept: CARE COORDINATION | Facility: CLINIC | Age: 54
End: 2024-01-04
Payer: COMMERCIAL

## 2024-01-18 ENCOUNTER — PATIENT OUTREACH (OUTPATIENT)
Dept: CARE COORDINATION | Facility: CLINIC | Age: 54
End: 2024-01-18
Payer: COMMERCIAL

## 2024-03-23 ENCOUNTER — HEALTH MAINTENANCE LETTER (OUTPATIENT)
Age: 54
End: 2024-03-23

## 2024-04-19 ENCOUNTER — HOSPITAL ENCOUNTER (OUTPATIENT)
Dept: CARDIOLOGY | Facility: CLINIC | Age: 54
Discharge: HOME | End: 2024-04-19
Attending: INTERNAL MEDICINE
Payer: COMMERCIAL

## 2024-04-19 DIAGNOSIS — I48.0 PAROXYSMAL ATRIAL FIBRILLATION (H): Primary | ICD-10-CM

## 2024-04-19 DIAGNOSIS — E78.5 HYPERLIPIDEMIA LDL GOAL <130: ICD-10-CM

## 2024-04-19 DIAGNOSIS — I48.0 PAROXYSMAL ATRIAL FIBRILLATION (H): ICD-10-CM

## 2024-04-19 LAB — LVEF ECHO: NORMAL

## 2024-04-19 PROCEDURE — 93306 TTE W/DOPPLER COMPLETE: CPT | Mod: 26 | Performed by: INTERNAL MEDICINE

## 2024-04-19 PROCEDURE — C8929 TTE W OR WO FOL WCON,DOPPLER: HCPCS

## 2024-04-19 PROCEDURE — 255N000002 HC RX 255 OP 636: Performed by: INTERNAL MEDICINE

## 2024-04-19 PROCEDURE — 75571 CT HRT W/O DYE W/CA TEST: CPT | Mod: 26 | Performed by: INTERNAL MEDICINE

## 2024-04-19 PROCEDURE — 75571 CT HRT W/O DYE W/CA TEST: CPT

## 2024-04-19 RX ADMIN — HUMAN ALBUMIN MICROSPHERES AND PERFLUTREN 9 ML: 10; .22 INJECTION, SOLUTION INTRAVENOUS at 14:42

## 2024-05-13 DIAGNOSIS — I48.0 PAROXYSMAL ATRIAL FIBRILLATION (H): ICD-10-CM

## 2024-05-13 RX ORDER — DILTIAZEM HYDROCHLORIDE 180 MG/1
180 CAPSULE, EXTENDED RELEASE ORAL DAILY
Qty: 90 CAPSULE | Refills: 0 | Status: SHIPPED | OUTPATIENT
Start: 2024-05-13 | End: 2024-08-14

## 2024-05-17 ENCOUNTER — OFFICE VISIT (OUTPATIENT)
Dept: CARDIOLOGY | Facility: CLINIC | Age: 54
End: 2024-05-17
Payer: COMMERCIAL

## 2024-05-17 VITALS
OXYGEN SATURATION: 96 % | BODY MASS INDEX: 37.09 KG/M2 | SYSTOLIC BLOOD PRESSURE: 130 MMHG | HEART RATE: 110 BPM | HEIGHT: 71 IN | DIASTOLIC BLOOD PRESSURE: 74 MMHG | WEIGHT: 264.9 LBS

## 2024-05-17 DIAGNOSIS — E78.5 HYPERLIPIDEMIA LDL GOAL <130: ICD-10-CM

## 2024-05-17 DIAGNOSIS — I48.0 PAROXYSMAL ATRIAL FIBRILLATION (H): Primary | ICD-10-CM

## 2024-05-17 PROCEDURE — 93000 ELECTROCARDIOGRAM COMPLETE: CPT | Performed by: INTERNAL MEDICINE

## 2024-05-17 PROCEDURE — 99213 OFFICE O/P EST LOW 20 MIN: CPT | Performed by: INTERNAL MEDICINE

## 2024-05-17 NOTE — LETTER
5/17/2024    Danyelle Larkin MD  [address] [facility] [address]  [place] [address]    RE: Jamison Gerber       Dear Colleague,     I had the pleasure of seeing Jamison Gerber in the [facility].  CARDIOLOGY CLINIC CONSULTATION    PRIMARY CARE PHYSICIAN:  Danyelle Larkin    HISTORY OF PRESENT ILLNESS:  This a very pleasant 54-year-old gentleman who is seen in consultation at [place].  He has been seeing me since 2018.       He initially came for screening because his father had a cardiac death at 54 years of age. Patient had a calcium score back in 2019 which was 0.  Repeat calcium scan in 2024 was also 0.  He is mild hyperlipidemia with LDL levels in the 120 range.     He has obesity and mild hypertriglyceridemia.    Was diagnosed with atrial fibrillation around that time of 2018 as well was referred to EP.  Rate control strategy was recommended.  His [id number] is 0.  He has sleep apnea which likely has triggered this.  He remains in permanent atrial fibrillation.  He is asymptomatic.  EF has been normal.  He has severe biatrial enlargement.     Today he is here for routine follow-up.  Denies any new cardiovascular symptoms.  He has been functionally quite active.  No angina heart failure syncope presyncope.    Recent echocardiogram showed normal LV function calcium score was 0.    PAST MEDICAL HISTORY:  Past Medical History:   Diagnosis Date    Family history of heart disease     Hyperlipidemia     Other abnormal glucose     PAF (paroxysmal atrial fibrillation) (H)     Sleep apnea     uses CPap       MEDICATIONS:  Current Outpatient Medications   Medication Sig Dispense Refill    aspirin (ASA) 81 MG chewable tablet Take 81 mg by mouth daily      Cholecalciferol (VITAMIN D-3) 1000 units CAPS Take 5,000 Units by mouth daily       diltiazem ER (DILT-XR) 180 MG 24 hr capsule Take 1 capsule (180 mg) by mouth daily 90 capsule 0    Multiple Vitamins-Minerals (MULTIVITAMIN ADULT PO)       Nutritional Supplements  (IMMUNE ENHANCE) TABS Take by mouth daily      omega 3 1000 MG CAPS Take by mouth daily       Probiotic Product (PROBIOTIC DAILY PO) Take by mouth daily       PSYLLIUM HUSK PO Take by mouth daily       tadalafil (CIALIS) 10 MG tablet TAKE ONE TABLET BY MOUTH DAILY AS NEEDED FOR EDTAKE ONE TABLET BY MOUTH DAILY AS NEEDED FOR ED Strength: 5 mg 90 tablet 3     No current facility-administered medications for this visit.       SOCIAL HISTORY:  I have reviewed this patient's social history and updated it with pertinent information if needed. Jamison Gerber  reports that he quit smoking about 12 years ago. His smoking use included cigarettes. He started smoking about 32 years ago. He has a 10 pack-year smoking history. He has quit using smokeless tobacco.  His smokeless tobacco use included chew. He reports current alcohol use. He reports current drug use. Drug: Marijuana.    PHYSICAL EXAM:  Pulse:  [110] 110  BP: (130)/(74) 130/74  SpO2:  [96 %] 96 %  264 lbs 14.4 oz    Constitutional: alert, no distress  Respiratory: Good bilateral air entry  Cardiovascular: Irregular heart sounds.  Heart rate 80 on auscultation.  No edema.  GI: nondistended obese.  Neuropsychiatric: appropriate affact    ASSESSMENT: Pertinent issues addressed/ reviewed during this cardiology visit  Overall doing quite well from a cardiac standpoint without symptoms.    RECOMMENDATIONS:  Patient is recurrent atrial fibrillation.  Continue current medications.  Healthy diet exercise and weight loss strongly recommended.  I have counseled him about diet to reduce his future risk of cardiovascular events.  From an A-fib standpoint he has no symptoms.  [id number] score is 0 at this time.  Follow-up cardiology in 2 years sooner if anything changes clinically.    It was a pleasure seeing this patient in clinic today. Please do not hesitate to contact me with any future questions.     EVELYN Reyes, [facility]  Cardiology - [facility]  May 17, 2024    This note  was completed in part using dictation via the Dragon voice recognition software. Some word and grammatical errors may occur and must be interpreted in the appropriate clinical context.  If there are any questions pertaining to this issue, please contact me for further clarification.      Thank you for allowing me to participate in the care of your patient.      Sincerely,     Yumiko Santos MD     [facility]  cc:   Tito Nova MD  [address] [place] [id number]

## 2024-05-17 NOTE — PROGRESS NOTES
CARDIOLOGY CLINIC CONSULTATION    PRIMARY CARE PHYSICIAN:  Danyelle Larkin    HISTORY OF PRESENT ILLNESS:  This a very pleasant 54-year-old gentleman who is seen in consultation at [place].  He has been seeing me since 2018.       He initially came for screening because his father had a cardiac death at 54 years of age. Patient had a calcium score back in 2019 which was 0.  Repeat calcium scan in 2024 was also 0.  He is mild hyperlipidemia with LDL levels in the 120 range.     He has obesity and mild hypertriglyceridemia.    Was diagnosed with atrial fibrillation around that time of 2018 as well was referred to EP.  Rate control strategy was recommended.  His [id number] is 0.  He has sleep apnea which likely has triggered this.  He remains in permanent atrial fibrillation.  He is asymptomatic.  EF has been normal.  He has severe biatrial enlargement.     Today he is here for routine follow-up.  Denies any new cardiovascular symptoms.  He has been functionally quite active.  No angina heart failure syncope presyncope.    Recent echocardiogram showed normal LV function calcium score was 0.    PAST MEDICAL HISTORY:  Past Medical History:   Diagnosis Date    Family history of heart disease     Hyperlipidemia     Other abnormal glucose     PAF (paroxysmal atrial fibrillation) (H)     Sleep apnea     uses CPap       MEDICATIONS:  Current Outpatient Medications   Medication Sig Dispense Refill    aspirin (ASA) 81 MG chewable tablet Take 81 mg by mouth daily      Cholecalciferol (VITAMIN D-3) 1000 units CAPS Take 5,000 Units by mouth daily       diltiazem ER (DILT-XR) 180 MG 24 hr capsule Take 1 capsule (180 mg) by mouth daily 90 capsule 0    Multiple Vitamins-Minerals (MULTIVITAMIN ADULT PO)       Nutritional Supplements (IMMUNE ENHANCE) TABS Take by mouth daily      omega 3 1000 MG CAPS Take by mouth daily       Probiotic Product (PROBIOTIC DAILY PO) Take by mouth daily       PSYLLIUM HUSK PO Take by mouth daily        tadalafil (CIALIS) 10 MG tablet TAKE ONE TABLET BY MOUTH DAILY AS NEEDED FOR EDTAKE ONE TABLET BY MOUTH DAILY AS NEEDED FOR ED Strength: 5 mg 90 tablet 3     No current facility-administered medications for this visit.       SOCIAL HISTORY:  I have reviewed this patient's social history and updated it with pertinent information if needed. Jamison Gerber  reports that he quit smoking about 12 years ago. His smoking use included cigarettes. He started smoking about 32 years ago. He has a 10 pack-year smoking history. He has quit using smokeless tobacco.  His smokeless tobacco use included chew. He reports current alcohol use. He reports current drug use. Drug: Marijuana.    PHYSICAL EXAM:  Pulse:  [110] 110  BP: (130)/(74) 130/74  SpO2:  [96 %] 96 %  264 lbs 14.4 oz    Constitutional: alert, no distress  Respiratory: Good bilateral air entry  Cardiovascular: Irregular heart sounds.  Heart rate 80 on auscultation.  No edema.  GI: nondistended obese.  Neuropsychiatric: appropriate affact    ASSESSMENT: Pertinent issues addressed/ reviewed during this cardiology visit  Overall doing quite well from a cardiac standpoint without symptoms.    RECOMMENDATIONS:  Patient is recurrent atrial fibrillation.  Continue current medications.  Healthy diet exercise and weight loss strongly recommended.  I have counseled him about diet to reduce his future risk of cardiovascular events.  From an A-fib standpoint he has no symptoms.  [id number] score is 0 at this time.  Follow-up cardiology in 2 years sooner if anything changes clinically.    It was a pleasure seeing this patient in clinic today. Please do not hesitate to contact me with any future questions.     EVELYN Reyes, [facility]  Cardiology - [facility]  May 17, 2024    This note was completed in part using dictation via the Dragon voice recognition software. Some word and grammatical errors may occur and must be interpreted in the appropriate clinical context.  If there are  any questions pertaining to this issue, please contact me for further clarification.

## 2024-08-14 DIAGNOSIS — I48.0 PAROXYSMAL ATRIAL FIBRILLATION (H): ICD-10-CM

## 2024-08-14 RX ORDER — DILTIAZEM HYDROCHLORIDE 180 MG/1
180 CAPSULE, EXTENDED RELEASE ORAL DAILY
Qty: 90 CAPSULE | Refills: 3 | Status: SHIPPED | OUTPATIENT
Start: 2024-08-14

## 2024-09-12 ENCOUNTER — DOCUMENTATION ONLY (OUTPATIENT)
Dept: SLEEP MEDICINE | Facility: CLINIC | Age: 54
End: 2024-09-12
Payer: COMMERCIAL

## 2024-09-12 DIAGNOSIS — G47.33 OBSTRUCTIVE SLEEP APNEA (ADULT) (PEDIATRIC): Primary | ICD-10-CM

## 2024-09-18 NOTE — PROGRESS NOTES
CPAP DME order signed. Follow-up due next year.  ResMed   Auto-PAP 10.0 - 14.0 cmH2O 30 day usage data:    100% of days with > 4 hours of use. 0/30 days with no use.   Average use 451 minutes per day.   95%ile Leak 14.05 L/min.   CPAP 95% pressure 11.8 cm.   AHI 1.14 events per hour.      Bennett Goltz, PA-C

## 2025-04-12 ENCOUNTER — HEALTH MAINTENANCE LETTER (OUTPATIENT)
Age: 55
End: 2025-04-12

## 2025-07-02 ENCOUNTER — OFFICE VISIT (OUTPATIENT)
Dept: SLEEP MEDICINE | Facility: CLINIC | Age: 55
End: 2025-07-02
Payer: COMMERCIAL

## 2025-07-02 VITALS
HEIGHT: 71 IN | OXYGEN SATURATION: 98 % | HEART RATE: 96 BPM | BODY MASS INDEX: 37.27 KG/M2 | SYSTOLIC BLOOD PRESSURE: 119 MMHG | WEIGHT: 266.2 LBS | DIASTOLIC BLOOD PRESSURE: 83 MMHG

## 2025-07-02 DIAGNOSIS — G47.33 OSA (OBSTRUCTIVE SLEEP APNEA): Primary | ICD-10-CM

## 2025-07-02 PROCEDURE — 99214 OFFICE O/P EST MOD 30 MIN: CPT | Performed by: PHYSICIAN ASSISTANT

## 2025-07-02 PROCEDURE — 1126F AMNT PAIN NOTED NONE PRSNT: CPT | Performed by: PHYSICIAN ASSISTANT

## 2025-07-02 PROCEDURE — 3079F DIAST BP 80-89 MM HG: CPT | Performed by: PHYSICIAN ASSISTANT

## 2025-07-02 PROCEDURE — 3074F SYST BP LT 130 MM HG: CPT | Performed by: PHYSICIAN ASSISTANT

## 2025-07-02 ASSESSMENT — SLEEP AND FATIGUE QUESTIONNAIRES
HOW LIKELY ARE YOU TO NOD OFF OR FALL ASLEEP WHILE SITTING AND READING: SLIGHT CHANCE OF DOZING
HOW LIKELY ARE YOU TO NOD OFF OR FALL ASLEEP WHILE SITTING INACTIVE IN A PUBLIC PLACE: WOULD NEVER DOZE
HOW LIKELY ARE YOU TO NOD OFF OR FALL ASLEEP WHILE LYING DOWN TO REST IN THE AFTERNOON WHEN CIRCUMSTANCES PERMIT: WOULD NEVER DOZE
HOW LIKELY ARE YOU TO NOD OFF OR FALL ASLEEP IN A CAR, WHILE STOPPED FOR A FEW MINUTES IN TRAFFIC: WOULD NEVER DOZE
HOW LIKELY ARE YOU TO NOD OFF OR FALL ASLEEP WHILE SITTING AND TALKING TO SOMEONE: WOULD NEVER DOZE
HOW LIKELY ARE YOU TO NOD OFF OR FALL ASLEEP WHILE WATCHING TV: WOULD NEVER DOZE
HOW LIKELY ARE YOU TO NOD OFF OR FALL ASLEEP WHILE SITTING QUIETLY AFTER LUNCH WITHOUT ALCOHOL: SLIGHT CHANCE OF DOZING
HOW LIKELY ARE YOU TO NOD OFF OR FALL ASLEEP WHEN YOU ARE A PASSENGER IN A CAR FOR AN HOUR WITHOUT A BREAK: SLIGHT CHANCE OF DOZING

## 2025-07-02 NOTE — NURSING NOTE
"Chief Complaint   Patient presents with    CPAP Follow Up     Sometimes experiencing leakage, struggling with finding comfortable position, frequent sweating       Initial /83   Pulse 96   Ht 1.803 m (5' 10.98\")   Wt 120.7 kg (266 lb 3.2 oz)   SpO2 98%   BMI 37.14 kg/m   Estimated body mass index is 37.14 kg/m  as calculated from the following:    Height as of this encounter: 1.803 m (5' 10.98\").    Weight as of this encounter: 120.7 kg (266 lb 3.2 oz).    Medication Reconciliation: complete  ESS: 3    DME: DENY Norton, VF    "

## 2025-07-02 NOTE — PROGRESS NOTES
CPAP Follow-Up Visit:    Date on this visit: 7/2/2025    Jamison Gerber has a follow-up visit today to review his CPAP use for SEEMA. His medical history is significant for paroxysmal atrial fibrillation, hyperlipidemia.      He had a sleeps study at [facility] in 7/2004. His AHI was 34/hr, RDI 47/hr with an O2 sanford of 86%. CPAP 10 cm was effective.      HST 10/10/2018:   Analysis Time:  240.7 minutes  Wt: 257#   AHI 38.9/hr. AHI was 36.4/hr supine, - /hr prone, 41.4/hr on left, and 39.6/hr on right.   Baseline SpO2 91%.  31.5 minutes spent below 89% (much of which appears to be artifact). The lowest oxygen saturation was 75%.   Position: Percent of time spent: supine - 15%, prone - 0%, on left - 39.7%, on right - 35.9%.        He has a Dreamstation 2 and feels it is way worse. He liked the Dreamstation 1.   He uses SoClean and cleans it regularly.      PAP machine: Respironics Dreamstation 2.   Pressure settings: 10-14 cm. He generally gets supplies online.  Auto-PAP 10.0 - 14.0 cmH2O 30 day usage data:    100% of days with > 4 hours of use. 0/30 days with no use.   Average use 426 minutes per day.   95%ile Leak 9.25 L/min.   CPAP 95% pressure 11.6 cm.   AHI 0.94 events per hour.         He sometimes has night sweats in the late morning. The room is 64 degrees. He sweats getting out of the shower too, so it is not a daytime issue. He gets angry when hungry. The sweating has been worse in the last 6-12 months. He denies new medications or dose change.  He loves CPAP.       No specialty comments available.    Do you use a CPAP Machine at home: (Patient-Rptd) Yes  Overall, on a scale of 0-10 how would you rate your CPAP (0 poor, 10 great): (Patient-Rptd) 8    What type of mask do you use: (Patient-Rptd) Nasal Pillow  p10 knock-off from [employer]   Is your mask comfortable: (Patient-Rptd) No  If not, why: (Patient-Rptd) the seal breaks hru out the night. mask not fitting right  How often do you replace supplies:    Is your mask  leaking: (Patient-Rptd) Yes  If yes, where do you feel it: (Patient-Rptd) around nose  How many night per week does the mask leak (0-7): (Patient-Rptd) 7    Do you notice snoring with mask on: (Patient-Rptd) No  Do you notice gasping arousals with mask on: (Patient-Rptd) No  Are you having significant oral or nasal dryness: (Patient-Rptd) No  Are you using the humidifier: yes  Does the water chamber run out before the night is over:no  Do you get condensation in the mask or hose:no  Is the pressure setting comfortable: (Patient-Rptd) No  If not, why: (Patient-Rptd) too much air flow    Typical bedtime: (Patient-Rptd) 11  Sleep latency on PAP therapy: (Patient-Rptd) 5 min  Typical wake time: (Patient-Rptd) 630  Wakes 1 times per night for 5 minutes. Reason for waking: change position, maybe 1x RR at about 5:30 AM.  How many hours on average per night are you using PAP therapy: (Patient-Rptd) while i sleep  How many hours are you sleeping per night: (Patient-Rptd) 6to 8  Do you feel well rested in the morning: (Patient-Rptd) Yes    Naps: 0.       Weight change since sleep study: 266 lbs, up 16# in the last 2 years      Past medical/surgical history, family history, social history, medications and allergies were reviewed.      Problem List:  Patient Active Problem List    Diagnosis Date Noted    PAF (paroxysmal atrial fibrillation) (H)      Priority: Medium    Hyperlipidemia      Priority: Medium    Family history of heart disease      Priority: Medium    SEEMA (obstructive sleep apnea) 09/26/2018     Priority: Medium        Impression/Plan:    (G47.33) SEEMA (obstructive sleep apnea)  (primary encounter diagnosis)  Comment: Jamison is using CPAP nightly and benefits significantly from use. He has some concerns about the pressure feeling high. His mask sometimes leaks and is hard to clean. He is interested in an alternative mask. He also sweats a lot, night and day. His machine motor seems to make a loud humming noise,  primarily when he takes a large breath.  Plan: Comprehensive DME        Continue auto CPAP 10-14 cm. Set ramp to 8 cm for 10 min. A prescription was written for new supplies. We reviewed recommendations for cleaning and replacing supplies.  We turned the humidity down. Consider the N20 mask. We turned the heat/humidity down to see if that reduces night sweats. I recommended he bring his machine in to [facility] to see if the motor should be inspected while still under warranty.  If still sweating, discuss with primary.       He will follow up with me in about 2 year(s).     31 minutes were spent on the date of the encounter doing chart review, history and exam, documentation and further activities as noted above.     Bennett Goltz, PA-C    CC: Danyelle Larkin MD

## 2025-07-16 ENCOUNTER — DOCUMENTATION ONLY (OUTPATIENT)
Dept: SLEEP MEDICINE | Facility: CLINIC | Age: 55
End: 2025-07-16
Payer: COMMERCIAL

## 2025-07-16 NOTE — PROGRESS NOTES
Patient came to [facility]  for mask fitting appointment on July 16, 2025. Patient requested to switch masks because his current Airfit P10 mask does not stay in place/slips up on the back of his head. Discussed the following masks: Airfit N20 large, Gamez FX standard, Eson 2 large. Patient selected a Resmed Gamez FX nasal pillow mask. Patient is not eligible for new mask until 8/5/2025, so this will be shipped out to him then.

## 2025-07-18 ENCOUNTER — APPOINTMENT (OUTPATIENT)
Dept: GENERAL RADIOLOGY | Facility: CLINIC | Age: 55
End: 2025-07-18
Attending: EMERGENCY MEDICINE
Payer: COMMERCIAL

## 2025-07-18 ENCOUNTER — HOSPITAL ENCOUNTER (EMERGENCY)
Facility: CLINIC | Age: 55
Discharge: HOME | End: 2025-07-18
Attending: EMERGENCY MEDICINE | Admitting: EMERGENCY MEDICINE
Payer: COMMERCIAL

## 2025-07-18 VITALS
SYSTOLIC BLOOD PRESSURE: 137 MMHG | HEART RATE: 85 BPM | DIASTOLIC BLOOD PRESSURE: 91 MMHG | OXYGEN SATURATION: 99 % | TEMPERATURE: 97.5 F | RESPIRATION RATE: 18 BRPM

## 2025-07-18 DIAGNOSIS — S43.401A SPRAIN OF RIGHT SHOULDER, UNSPECIFIED SHOULDER SPRAIN TYPE, INITIAL ENCOUNTER: ICD-10-CM

## 2025-07-18 DIAGNOSIS — S40.011A CONTUSION OF RIGHT SHOULDER, INITIAL ENCOUNTER: ICD-10-CM

## 2025-07-18 PROCEDURE — 99283 EMERGENCY DEPT VISIT LOW MDM: CPT | Performed by: EMERGENCY MEDICINE

## 2025-07-18 PROCEDURE — 73030 X-RAY EXAM OF SHOULDER: CPT | Mod: RT

## 2025-07-18 ASSESSMENT — COLUMBIA-SUICIDE SEVERITY RATING SCALE - C-SSRS
2. HAVE YOU ACTUALLY HAD ANY THOUGHTS OF KILLING YOURSELF IN THE PAST MONTH?: NO
1. IN THE PAST MONTH, HAVE YOU WISHED YOU WERE DEAD OR WISHED YOU COULD GO TO SLEEP AND NOT WAKE UP?: NO
6. HAVE YOU EVER DONE ANYTHING, STARTED TO DO ANYTHING, OR PREPARED TO DO ANYTHING TO END YOUR LIFE?: NO

## 2025-07-18 ASSESSMENT — ACTIVITIES OF DAILY LIVING (ADL): ADLS_ACUITY_SCORE: 41

## 2025-07-18 NOTE — DISCHARGE INSTRUCTIONS
I want you to use ice as well as ibuprofen and Tylenol.  I am providing you a sling, however I do not want you to use that continuously or for more than a few days so that you do not develop a frozen shoulder.

## 2025-07-18 NOTE — ED TRIAGE NOTES
Pt reports tripping on carpet on home on Wednesday and injuring right shoulder. States pain with lifting arm above shoulder height. And some loss of range of motion.      Triage Assessment (Adult)       Row Name 07/18/25 2208          Triage Assessment    Airway WDL WDL        Respiratory WDL    Respiratory WDL WDL        Skin Circulation/Temperature WDL    Skin Circulation/Temperature WDL WDL        Cardiac WDL    Cardiac WDL WDL        Peripheral/Neurovascular WDL    Peripheral Neurovascular WDL WDL        Cognitive/Neuro/Behavioral WDL    Cognitive/Neuro/Behavioral WDL WDL

## 2025-07-18 NOTE — ED PROVIDER NOTES
Emergency Department Note      History of Present Illness     Chief Complaint   Shoulder Pain    HPI   Jamison Gerber is a 55 year old right hand dominant male with a history of hyperlipidemia and atrial fibrillation who presents for evaluation after a fall.  Approximately 2 days ago (7/16/25) while in his house, he tripped on the carpet and fell onto his right shoulder. He denies any trauma to his head during this incident. Since this fall, he has experienced a dull aching pain in his right arm as well as difficulty raising his arm above his head. He indicates that he has been having difficulty raising his right arm without assistance.  He denies neck pain or any numbness or tingling in his right arm. He denies use of anticoagulants.    Independent Historian   None    Review of External Notes   I reviewed the patient's MIIC, noting his last Tdap was in 2021.    Past Medical History     Medical History and Problem List   Family history of heart disease   Hyperlipidemia   Other abnormal glucose   PAF (paroxysmal atrial fibrillation)   Sleep apnea       Medications   Aspirin   Diltiazem   Tadalafil         Surgical History   Eyelid lesion excision    Physical Exam     Patient Vitals for the past 24 hrs:   BP Temp Temp src Pulse Resp SpO2   07/18/25 1257 (!) 144/94 97.5  F (36.4  C) Temporal 98 18 98 %     Physical Exam  Nursing note and vitals reviewed.  Constitutional:  Oriented to person, place, and time. Cooperative.   HENT:   Nose:    Nose normal.   Mouth/Throat:   Mucous membranes are normal.   Eyes:    Conjunctivae normal and EOM are normal.      Pupils are equal, round, and reactive to light.   Neck:    Trachea normal.   Cardiovascular:  Normal rate, regular rhythm, normal heart sounds and normal pulses. No murmur heard.  Pulmonary/Chest:  Effort normal and breath sounds normal.   Abdominal:   Soft. Normal appearance and bowel sounds are normal.      There is no tenderness.      There is no rebound and no CVA  tenderness.   Musculoskeletal:  Some tenderness to palpation over the right shoulder, mainly over the glenoid region but no bony step-offs or crepitus.  Passive range of motion of the right shoulder is normal, however there is some discomfort at the extreme of abduction.  Patient is unable to raise the arm up from about 80 degrees to 170 degrees.  Extremities otherwise atraumatic x 4.   Neurological:   Alert and oriented to person, place, and time. Normal strength.      No cranial nerve deficit or sensory deficit. GCS eye subscore is 4. GCS verbal subscore is 5. GCS motor subscore is 6.  Distal CMS intact in the right hand.  Skin:    Healing abrasion to the right knee. No rash noted.   Psychiatric:   Normal mood and affect.      Diagnostics     Lab Results   Labs Ordered and Resulted from Time of ED Arrival to Time of ED Departure - No data to display    Imaging   XR Shoulder Right G/E 3 Views   Final Result   IMPRESSION: Anatomic alignment of the right shoulder. No fracture. Mild glenohumeral and acromioclavicular joint arthrosis. 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          EKG   None     Independent Interpretation   I independently interpreted the patient's right shoulder X-Rays, and I do not appreciate any fractures.    ED Course      Medications Administered   Medications - No data to display    Procedures   Procedures     Discussion of Management   None    ED Course   ED Course as of 07/18/25 1338   Fri Jul 18, 2025   1303 I obtained the history and examined the patient as noted above.   1335 I discussed discharge instructions, patient is comfortable with discharge.         Additional Documentation  None    Medical Decision Making / Diagnosis     CMS Diagnoses: None    MIPS   None               [facility]   Jamison Gerber is a 55 year old male who came in for further evaluation of a right shoulder injury.  He had x-rays obtained, which did not show any acute fractures.  I suspect that he  actually has a rotator cuff injury based on the history and his exam.  However I indicated it is also possible this could just be from a contusion and/or sprain.  Regardless, I think it is best that he follow-up with [facility], and I am providing him their phone number.  I recommended ice as well as ibuprofen or Tylenol.  We also provided him a sling to use for comfort.  However I indicated he should not use that continuously or longer than a few days so that he does not develop a frozen shoulder.  I also showed him gentle range of motion exercises that he can do to help prevent developing a frozen shoulder.    Disposition   The patient was discharged.     Diagnosis     ICD-10-CM    1. Sprain of right shoulder, unspecified shoulder sprain type, initial encounter  S43.401A       2. Contusion of right shoulder, initial encounter  S40.011A            Discharge Medications   New Prescriptions    No medications on file         Scribe Disclosure:  Fco WOODS, am serving as a scribe at 1:14 PM on 7/18/2025 to document services personally performed by Manpreet Scruggs MD based on my observations and the provider's statements to me.     Scribe Disclosure:  IKwesi, am serving as a scribe  to Fco Haq at 1:18 PM on 7/18/2025 to document services personally performed by Manpreet Scruggs MD based on my observations and the provider's statements to me.         Manpreet Scruggs MD  07/18/25 2270

## 2025-07-28 ENCOUNTER — TRANSFERRED RECORDS (OUTPATIENT)
Dept: HEALTH INFORMATION MANAGEMENT | Facility: CLINIC | Age: 55
End: 2025-07-28
Payer: COMMERCIAL

## (undated) RX ORDER — FENTANYL CITRATE 50 UG/ML
INJECTION, SOLUTION INTRAMUSCULAR; INTRAVENOUS
Status: DISPENSED
Start: 2019-01-10